# Patient Record
Sex: MALE | Race: WHITE | NOT HISPANIC OR LATINO | Employment: FULL TIME | URBAN - METROPOLITAN AREA
[De-identification: names, ages, dates, MRNs, and addresses within clinical notes are randomized per-mention and may not be internally consistent; named-entity substitution may affect disease eponyms.]

---

## 2021-11-22 ENCOUNTER — TELEPHONE (OUTPATIENT)
Dept: SCHEDULING | Facility: IMAGING CENTER | Age: 31
End: 2021-11-22

## 2021-11-27 NOTE — PROGRESS NOTES
Subjective:     CC:  Diagnoses of Obesity (BMI 30.0-34.9), Chronic GERD, Need for vaccination, Need for hepatitis C screening test, Screening for HIV (human immunodeficiency virus), Jackhammer esophagus, and Chronic pain of left knee were pertinent to this visit.    HISTORY OF THE PRESENT ILLNESS: Patient is a 31 y.o. male. This pleasant patient is here today to establish care and discuss chronic conditions. His prior PCP was provider in [place].    Problem   Obesity (Bmi 30.0-34.9)    Chronic condition. He tries to eat healthy in general. Drinks 1 soda a day. He lifts weight, running, golf regularly.     Chronic Gerd    Chronic condition. He just started taking pantoprazole 20mg daily for 2 weeks with good relief of symptoms. He would like a refill on his medication today. No acute concerns.     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 Has been ongoing for 1.5 yrs. Patient states he still feels discomfort in the back of his throat, associated with shortness of breath with vigorous exercise. He feels the acid reflux part is controlled with Protonix but he has this persistent discomfort in his throat that bother him. He is willing to even go for surgical intervention if necessary. Symptom severity rated as 3/10.     Chronic Pain of Left Knee    Chronic condition. He had previous left knee injury in 2019. He was previously doing PT in [place] and recently has been doing PT here in [place] and [facility]. No acute concerns at this time.         Current Outpatient Medications Ordered in Epic   Medication Sig Dispense Refill   • azelastine (ASTELIN) 137 MCG/SPRAY nasal spray Administer 2 Sprays into affected nostril(S).     • fluticasone (FLONASE) 50 MCG/ACT nasal spray Administer 1-2 Sprays into affected nostril(S).     • pantoprazole (PROTONIX) 20 MG tablet Take 1 Tablet by mouth every morning before breakfast for 30 days. 30 Tablet 3     No current Epic-ordered facility-administered  "medications on file.     SH: works as , moved from [place] to [place] around 07/2021, no regular contact with chemicals, occasional cigar, social EtOH, denies illicit drugs    Health Maintenance: reviewed and discussed with patient  Vaccines: due for flu, Tdap received 02/2021    Patient desires flu vaccine today. Denies recent sickness. Never had allergic reaction to this vaccine in the past.    ROS:   Gen: no fevers/chills, no changes in weight  Eyes: no changes in vision  ENT: no sore throat, no hearing loss, no bloody nose, + throat discomfort  Pulm: + shortness of breath with vigorous exercise, no cough  CV: no chest pain, no palpitations  GI: no nausea/vomiting, no diarrhea  : no dysuria  MSk: no myalgias  Skin: no rash  Neuro: no headaches      Objective:     Exam: /80 (BP Location: Left arm, Patient Position: Sitting, BP Cuff Size: Adult)   Pulse 71   Temp 36.6 °C (97.8 °F) (Temporal)   Resp 16   Ht 1.88 m (6' 2\")   Wt 109 kg (240 lb)   SpO2 95%  Body mass index is 30.81 kg/m².    General: Normal appearing. No distress.  HEENT: Normocephalic. Eyes conjunctiva clear lids without ptosis, ears normal shape and contour, canals are clear bilaterally, tympanic membranes are benign, nasal mucosa benign, oropharynx is without erythema, edema or exudates.   Neck: Supple without JVD or bruit. Thyroid is not enlarged.  Pulmonary: Clear to ausculation.  Normal effort. No rales, ronchi, or wheezing.  Cardiovascular: Regular rate and rhythm without murmur. Carotid and radial pulses are intact and equal bilaterally.  Abdomen: Soft, nontender, nondistended. Normal bowel sounds.  Neurologic: Grossly nonfocal  Skin: Warm and dry.  No obvious lesions.  Musculoskeletal: Normal gait. No extremity cyanosis, clubbing, or edema.  Psych: Normal mood and affect. Alert and oriented x3. Judgment and insight is normal.    Labs: no recent labs available for review    Assessment & Plan:   31 y.o. male " with the following -    Problem List Items Addressed This Visit     Obesity (BMI 30.0-34.9)  Body mass index is 30.81 kg/m².  - f/u CMP, HbA1C, lipid panel, thyroid panel  - discussed lifestyle modifications including weight loss (lose 5-10% of current body weight, no more than 2 pounds per week), healthy diet (eat regular meals with a variety of food, limit daily intake of saturated fat and sodium, limit high-sugar foods, avoid sodas and alcohol, and stay physically active (at least 30 minutes of moderate-intensity physical activity 3-5x/week)   - pt was offered referral to dietician/nutrition specialist as per USPSTF guideline, patient declined today    Relevant Orders    Comp Metabolic Panel    HEMOGLOBIN A1C    Lipid Profile    TSH WITH REFLEX TO FT4    Patient identified as having weight management issue.  Appropriate orders and counseling given.    Chronic GERD  Chronic condition, stable.  - cont pantoprazole 20mg daily, Rx sent    Relevant Medications    pantoprazole (PROTONIX) 20 MG tablet    Other Relevant Orders    Comp Metabolic Panel    Jackhammer esophagus  Chronic condition, uncontrolled. Suspect most of his symptoms are triggered by the Jackhammer esophagus.  - f/u CXR  - advised to trial peppermint oil  - consider CCB if symptoms not improved or worsen  - consider GI referral    Relevant Medications    pantoprazole (PROTONIX) 20 MG tablet    Other Relevant Orders    DX-CHEST-2 VIEWS    Chronic pain of left knee  Chronic condition, stable.  - cont PT      Other Visit Diagnoses     Need for vaccination  Pt desires flu vaccination. Risks and benefits discussed. No contraindications today. Vaccine given. Follow-up precautions discussed.        Relevant Orders    INFLUENZA VACCINE QUAD INJ (PF) (Completed)    Need for hepatitis C screening test        Relevant Orders    HEP C VIRUS ANTIBODY    Screening for HIV (human immunodeficiency virus)        Relevant Orders    HIV AG/AB COMBO ASSAY DIAGNOSTIC         Return in about 3 weeks (around 12/22/2021) for lab results.    Please note that this dictation was created using voice recognition software. I have made every reasonable attempt to correct obvious errors, but I expect that there are errors of grammar and possibly content that I did not discover before finalizing the note.

## 2021-12-01 ENCOUNTER — HOSPITAL ENCOUNTER (OUTPATIENT)
Dept: RADIOLOGY | Facility: MEDICAL CENTER | Age: 31
End: 2021-12-01
Attending: STUDENT IN AN ORGANIZED HEALTH CARE EDUCATION/TRAINING PROGRAM
Payer: COMMERCIAL

## 2021-12-01 ENCOUNTER — OFFICE VISIT (OUTPATIENT)
Dept: MEDICAL GROUP | Facility: MEDICAL CENTER | Age: 31
End: 2021-12-01
Payer: COMMERCIAL

## 2021-12-01 ENCOUNTER — HOSPITAL ENCOUNTER (OUTPATIENT)
Dept: LAB | Facility: MEDICAL CENTER | Age: 31
End: 2021-12-01
Attending: STUDENT IN AN ORGANIZED HEALTH CARE EDUCATION/TRAINING PROGRAM
Payer: COMMERCIAL

## 2021-12-01 VITALS
HEART RATE: 71 BPM | TEMPERATURE: 97.8 F | HEIGHT: 74 IN | OXYGEN SATURATION: 95 % | WEIGHT: 240 LBS | SYSTOLIC BLOOD PRESSURE: 116 MMHG | BODY MASS INDEX: 30.8 KG/M2 | DIASTOLIC BLOOD PRESSURE: 80 MMHG | RESPIRATION RATE: 16 BRPM

## 2021-12-01 DIAGNOSIS — G89.29 CHRONIC PAIN OF LEFT KNEE: ICD-10-CM

## 2021-12-01 DIAGNOSIS — Z23 NEED FOR VACCINATION: ICD-10-CM

## 2021-12-01 DIAGNOSIS — K21.9 CHRONIC GERD: ICD-10-CM

## 2021-12-01 DIAGNOSIS — Z11.4 SCREENING FOR HIV (HUMAN IMMUNODEFICIENCY VIRUS): ICD-10-CM

## 2021-12-01 DIAGNOSIS — Z11.59 NEED FOR HEPATITIS C SCREENING TEST: ICD-10-CM

## 2021-12-01 DIAGNOSIS — M25.562 CHRONIC PAIN OF LEFT KNEE: ICD-10-CM

## 2021-12-01 DIAGNOSIS — K22.4 JACKHAMMER ESOPHAGUS: ICD-10-CM

## 2021-12-01 DIAGNOSIS — E66.9 OBESITY (BMI 30.0-34.9): ICD-10-CM

## 2021-12-01 PROBLEM — E66.811 OBESITY (BMI 30.0-34.9): Status: ACTIVE | Noted: 2021-12-01

## 2021-12-01 LAB
ALBUMIN SERPL BCP-MCNC: 4.7 G/DL (ref 3.2–4.9)
ALBUMIN/GLOB SERPL: 1.8 G/DL
ALP SERPL-CCNC: 82 U/L (ref 30–99)
ALT SERPL-CCNC: 66 U/L (ref 2–50)
ANION GAP SERPL CALC-SCNC: 10 MMOL/L (ref 7–16)
AST SERPL-CCNC: 37 U/L (ref 12–45)
BILIRUB SERPL-MCNC: 0.5 MG/DL (ref 0.1–1.5)
BUN SERPL-MCNC: 17 MG/DL (ref 8–22)
CALCIUM SERPL-MCNC: 9.5 MG/DL (ref 8.4–10.2)
CHLORIDE SERPL-SCNC: 105 MMOL/L (ref 96–112)
CHOLEST SERPL-MCNC: 206 MG/DL (ref 100–199)
CO2 SERPL-SCNC: 26 MMOL/L (ref 20–33)
CREAT SERPL-MCNC: 1.26 MG/DL (ref 0.5–1.4)
EST. AVERAGE GLUCOSE BLD GHB EST-MCNC: 91 MG/DL
FASTING STATUS PATIENT QL REPORTED: NORMAL
GLOBULIN SER CALC-MCNC: 2.6 G/DL (ref 1.9–3.5)
GLUCOSE SERPL-MCNC: 92 MG/DL (ref 65–99)
HBA1C MFR BLD: 4.8 % (ref 4–5.6)
HCV AB SER QL: NORMAL
HDLC SERPL-MCNC: 45 MG/DL
HIV 1+2 AB+HIV1 P24 AG SERPL QL IA: NORMAL
LDLC SERPL CALC-MCNC: 131 MG/DL
POTASSIUM SERPL-SCNC: 4.4 MMOL/L (ref 3.6–5.5)
PROT SERPL-MCNC: 7.3 G/DL (ref 6–8.2)
SODIUM SERPL-SCNC: 141 MMOL/L (ref 135–145)
TRIGL SERPL-MCNC: 152 MG/DL (ref 0–149)
TSH SERPL DL<=0.005 MIU/L-ACNC: 2.26 UIU/ML (ref 0.38–5.33)

## 2021-12-01 PROCEDURE — 36415 COLL VENOUS BLD VENIPUNCTURE: CPT

## 2021-12-01 PROCEDURE — 83036 HEMOGLOBIN GLYCOSYLATED A1C: CPT

## 2021-12-01 PROCEDURE — 90686 IIV4 VACC NO PRSV 0.5 ML IM: CPT | Performed by: STUDENT IN AN ORGANIZED HEALTH CARE EDUCATION/TRAINING PROGRAM

## 2021-12-01 PROCEDURE — 90471 IMMUNIZATION ADMIN: CPT | Performed by: STUDENT IN AN ORGANIZED HEALTH CARE EDUCATION/TRAINING PROGRAM

## 2021-12-01 PROCEDURE — G0475 HIV COMBINATION ASSAY: HCPCS

## 2021-12-01 PROCEDURE — 86803 HEPATITIS C AB TEST: CPT

## 2021-12-01 PROCEDURE — 84443 ASSAY THYROID STIM HORMONE: CPT

## 2021-12-01 PROCEDURE — 71046 X-RAY EXAM CHEST 2 VIEWS: CPT

## 2021-12-01 PROCEDURE — 99204 OFFICE O/P NEW MOD 45 MIN: CPT | Mod: 25 | Performed by: STUDENT IN AN ORGANIZED HEALTH CARE EDUCATION/TRAINING PROGRAM

## 2021-12-01 PROCEDURE — 80061 LIPID PANEL: CPT

## 2021-12-01 PROCEDURE — 80053 COMPREHEN METABOLIC PANEL: CPT

## 2021-12-01 RX ORDER — IBUPROFEN 600 MG/1
600 TABLET ORAL
COMMUNITY
End: 2021-12-01

## 2021-12-01 RX ORDER — AZELASTINE 1 MG/ML
2 SPRAY, METERED NASAL
COMMUNITY
Start: 2021-03-03 | End: 2022-01-06

## 2021-12-01 RX ORDER — PANTOPRAZOLE SODIUM 20 MG/1
1 TABLET, DELAYED RELEASE ORAL
COMMUNITY
Start: 2021-01-25 | End: 2021-12-01 | Stop reason: SDUPTHER

## 2021-12-01 RX ORDER — PANTOPRAZOLE SODIUM 20 MG/1
20 TABLET, DELAYED RELEASE ORAL
Qty: 30 TABLET | Refills: 3 | Status: SHIPPED | OUTPATIENT
Start: 2021-12-01 | End: 2021-12-21

## 2021-12-01 RX ORDER — FLUTICASONE PROPIONATE 50 MCG
1-2 SPRAY, SUSPENSION (ML) NASAL
COMMUNITY
Start: 2021-02-17 | End: 2022-01-06

## 2021-12-01 ASSESSMENT — PATIENT HEALTH QUESTIONNAIRE - PHQ9: CLINICAL INTERPRETATION OF PHQ2 SCORE: 0

## 2021-12-05 ENCOUNTER — HOSPITAL ENCOUNTER (EMERGENCY)
Facility: MEDICAL CENTER | Age: 31
End: 2021-12-05
Attending: EMERGENCY MEDICINE
Payer: COMMERCIAL

## 2021-12-05 VITALS
SYSTOLIC BLOOD PRESSURE: 140 MMHG | DIASTOLIC BLOOD PRESSURE: 95 MMHG | HEIGHT: 74 IN | BODY MASS INDEX: 30.84 KG/M2 | WEIGHT: 240.3 LBS | OXYGEN SATURATION: 95 % | RESPIRATION RATE: 18 BRPM | TEMPERATURE: 97.9 F | HEART RATE: 66 BPM

## 2021-12-05 DIAGNOSIS — S61.210A LACERATION OF RIGHT INDEX FINGER, FOREIGN BODY PRESENCE UNSPECIFIED, NAIL DAMAGE STATUS UNSPECIFIED, INITIAL ENCOUNTER: ICD-10-CM

## 2021-12-05 PROCEDURE — 303747 HCHG EXTRA SUTURE

## 2021-12-05 PROCEDURE — 304999 HCHG REPAIR-SIMPLE/INTERMED LEVEL 1

## 2021-12-05 PROCEDURE — 99282 EMERGENCY DEPT VISIT SF MDM: CPT

## 2021-12-05 PROCEDURE — 304217 HCHG IRRIGATION SYSTEM

## 2021-12-05 PROCEDURE — 700101 HCHG RX REV CODE 250: Performed by: EMERGENCY MEDICINE

## 2021-12-05 RX ORDER — LIDOCAINE HYDROCHLORIDE 20 MG/ML
20 INJECTION, SOLUTION INFILTRATION; PERINEURAL ONCE
Status: COMPLETED | OUTPATIENT
Start: 2021-12-05 | End: 2021-12-05

## 2021-12-05 RX ADMIN — LIDOCAINE HYDROCHLORIDE 20 ML: 20 INJECTION, SOLUTION INFILTRATION; PERINEURAL at 18:30

## 2021-12-05 ASSESSMENT — PAIN DESCRIPTION - DESCRIPTORS: DESCRIPTORS: ACHING;TEARING

## 2021-12-06 NOTE — ED PROVIDER NOTES
ED Provider Note    CHIEF COMPLAINT  Chief Complaint   Patient presents with   • Laceration   • Digit Pain       HPI  Deangelo Tovar is a 31 y.o. male who presents for evaluation of accidental laceration to the distal aspect of the right index finger just distal to the DIP joint on the volar aspect.  The patient was apparently doing some work in the kitchen, picked up a knife and sustained a 1.5 cm laceration transversely across the volar aspect of his digit.  He thinks his tetanus is up-to-date.  Injury occurred within the last hour.  Patient is otherwise healthy.  No other symptoms reported such as numbness weakness or tingling    REVIEW OF SYSTEMS  See HPI for further details.  No numbness weakness or tingling all other systems are negative.     PAST MEDICAL HISTORY  No past medical history on file.  No significant medical history  FAMILY HISTORY  Noncontributory    SOCIAL HISTORY  Social History     Socioeconomic History   • Marital status:      Spouse name: Not on file   • Number of children: Not on file   • Years of education: Not on file   • Highest education level: Not on file   Occupational History   • Not on file   Tobacco Use   • Smoking status: Never Smoker   • Smokeless tobacco: Never Used   Substance and Sexual Activity   • Alcohol use: Yes     Comment: Occasionally   • Drug use: Not Currently   • Sexual activity: Not on file   Other Topics Concern   • Not on file   Social History Narrative   • Not on file     Social Determinants of Health     Financial Resource Strain:    • Difficulty of Paying Living Expenses: Not on file   Food Insecurity:    • Worried About Running Out of Food in the Last Year: Not on file   • Ran Out of Food in the Last Year: Not on file   Transportation Needs:    • Lack of Transportation (Medical): Not on file   • Lack of Transportation (Non-Medical): Not on file   Physical Activity:    • Days of Exercise per Week: Not on file   • Minutes of Exercise per Session: Not on  "file   Stress:    • Feeling of Stress : Not on file   Social Connections:    • Frequency of Communication with Friends and Family: Not on file   • Frequency of Social Gatherings with Friends and Family: Not on file   • Attends Jain Services: Not on file   • Active Member of Clubs or Organizations: Not on file   • Attends Club or Organization Meetings: Not on file   • Marital Status: Not on file   Intimate Partner Violence:    • Fear of Current or Ex-Partner: Not on file   • Emotionally Abused: Not on file   • Physically Abused: Not on file   • Sexually Abused: Not on file   Housing Stability:    • Unable to Pay for Housing in the Last Year: Not on file   • Number of Places Lived in the Last Year: Not on file   • Unstable Housing in the Last Year: Not on file       SURGICAL HISTORY  No past surgical history on file.    CURRENT MEDICATIONS  Home Medications     Reviewed by Joe Michael R.N. (Registered Nurse) on 12/05/21 at 1816  Med List Status: Not Addressed   Medication Last Dose Status   azelastine (ASTELIN) 137 MCG/SPRAY nasal spray  Active   fluticasone (FLONASE) 50 MCG/ACT nasal spray  Active   pantoprazole (PROTONIX) 20 MG tablet  Active                ALLERGIES  No Known Allergies    PHYSICAL EXAM  VITAL SIGNS: /92   Pulse 66   Temp 36.6 °C (97.9 °F) (Temporal)   Resp 18   Ht 1.88 m (6' 2\")   Wt 109 kg (240 lb 4.8 oz)   SpO2 98%   BMI 30.85 kg/m²       Constitutional: Well developed, Well nourished, No acute distress, Non-toxic appearance.   HENT: Normocephalic, Atraumatic, Bilateral external ears normal, Oropharynx moist, No oral exudates, Nose normal.   Eyes: PERRLA, EOMI, Conjunctiva normal, No discharge.   Neck: Normal range of motion, No tenderness, Supple, No stridor.   Cardiovascular: Normal heart rate, Normal rhythm, No murmurs, No rubs, No gallops.   Thorax & Lungs: Normal breath sounds, No respiratory distress, No wheezing, No chest tenderness.   Abdomen: Bowel sounds " normal, Soft, No tenderness, No masses, No pulsatile masses.   Skin: Warm, Dry, No erythema, No rash.   Extremities: Right hand exam is notable for a transverse laceration deep enough to require sutures distal to the DIP joint of the left index finger on the volar aspect.  Flexion and extension is normal two-point discrimination distal to the injury is normal.  No exposed tendon or bone noted on exam  Neurologic: Alert & oriented x 3, Normal motor function, Normal sensory function, No focal deficits noted.   Psychiatric: Affect normal, Judgment normal, Mood normal.         COURSE & MEDICAL DECISION MAKING  Pertinent Labs & Imaging studies reviewed. (See chart for details)  Physician procedure 1.5 cm finger laceration.  The wound was anesthetized with a digital block on the volar aspect using a total of 3 cc of 2% lidocaine without epinephrine.  The wound was copiously irrigated with sterile saline.  Sterile prep and drape.  The wound was gently explored no underlying foreign body or exposed tendon bone or blood vessel.  A total of 5, five-point 0 interrupted nylon sutures were placed.  Good cosmetic result.  No complications.    Patient is apparently up-to-date on his tetanus.  This is a low risk wound for infection therefore I do not feel that antibiotic prophylaxis is indicated.  Suture removal in 7 to 10 days.  I have provided the patient with supplies for antibiotic ointment, dressing changes and Coban    FINAL IMPRESSION  1.  1.5 cm finger laceration, right index finger      Electronically signed by: Corbin Montes M.D., 12/5/2021 6:24 PM

## 2021-12-06 NOTE — ED TRIAGE NOTES
"Presents complaining of an accidental self inflicted right index laceration with a knife, incurred earlier today.  Chief Complaint   Patient presents with   • Laceration   • Digit Pain     /92   Pulse 66   Temp 36.6 °C (97.9 °F) (Temporal)   Resp 18   Ht 1.88 m (6' 2\")   Wt 109 kg (240 lb 4.8 oz)   SpO2 98%   BMI 30.85 kg/m²   Has this patient been vaccinated for COVID YES       "

## 2021-12-17 PROBLEM — E78.2 MIXED HYPERLIPIDEMIA: Status: ACTIVE | Noted: 2021-12-17

## 2021-12-19 NOTE — PROGRESS NOTES
Subjective:     CC: lab results f/u    HPI:   Deangelo presents today for lab results f/u.    Problem   Elevated Alt Measurement    Acute issue. ALT 60s.     Mixed Hyperlipidemia    Acute issue. Most recent lipid panel with  and .     Obesity (Bmi 30.0-34.9)    Chronic condition. He tries to eat healthy in general. Drinks 1 soda a day. He lifts weight, running, golf regularly.     Chronic Gerd    Chronic condition. He has been taking pantoprazole 20mg daily which give him mild relief of his symptoms. He would like to trial a higher dose and see if that would provide more relief.     Jackhammer Esophagus    Chronic condition. Diagnosed 2016 in [place]. Has been ongoing for 1.5 yrs. Patient states he still feels discomfort in the back of his throat, associated with shortness of breath with vigorous exercise. He feels the acid reflux part is relatively controlled with Protonix but he has this persistent discomfort in his throat that bother him. He is willing to even go for surgical intervention if necessary. Symptom severity rated as 3/10.         Current Outpatient Medications Ordered in Epic   Medication Sig Dispense Refill   • pantoprazole (PROTONIX) 40 MG Tablet Delayed Response Take 1 Tablet by mouth every day. 90 Tablet 1   • azelastine (ASTELIN) 137 MCG/SPRAY nasal spray Administer 2 Sprays into affected nostril(S).     • fluticasone (FLONASE) 50 MCG/ACT nasal spray Administer 1-2 Sprays into affected nostril(S).       No current Epic-ordered facility-administered medications on file.     SH: works as , moved from [place] to [place] around 07/2021, no regular contact with chemicals, occasional cigar, social EtOH, denies illicit drugs     Health Maintenance: reviewed and discussed with patient  Vaccines: flu received 12/2021, Tdap received 02/2021, Moderna COVID #2 received 05/2021, plans to get booster shot at local pharmacy    ROS:  Gen: no fevers/chills  ENT: + throat  "discomfort  Pulm: no sob, no cough  CV: no chest pain, no palpitations  GI: no nausea/vomiting, no diarrhea  Skin: no rash  Neuro: no headaches, no numbness/tingling    Objective:     Exam:  /74 (BP Location: Left arm, Patient Position: Sitting, BP Cuff Size: Adult)   Pulse 78   Temp 36.6 °C (97.8 °F) (Temporal)   Resp 18   Ht 1.88 m (6' 2\")   Wt 114 kg (250 lb 7.1 oz)   SpO2 100%   BMI 32.15 kg/m²  Body mass index is 32.15 kg/m².    General: Normal appearing. No distress.  Pulmonary: Clear to ausculation.  Normal effort. No rales, ronchi, or wheezing.  Cardiovascular: Regular rate and rhythm without murmur. Carotid and radial pulses are intact and equal bilaterally.  Neurologic: Grossly nonfocal    Labs:   Lab Results   Component Value Date/Time    SODIUM 141 12/01/2021 09:17 AM    POTASSIUM 4.4 12/01/2021 09:17 AM    CHLORIDE 105 12/01/2021 09:17 AM    CO2 26 12/01/2021 09:17 AM    ANION 10.0 12/01/2021 09:17 AM    GLUCOSE 92 12/01/2021 09:17 AM    BUN 17 12/01/2021 09:17 AM    CREATININE 1.26 12/01/2021 09:17 AM    CALCIUM 9.5 12/01/2021 09:17 AM    ASTSGOT 37 12/01/2021 09:17 AM    ALTSGPT 66 (H) 12/01/2021 09:17 AM    TBILIRUBIN 0.5 12/01/2021 09:17 AM    ALBUMIN 4.7 12/01/2021 09:17 AM    TOTPROTEIN 7.3 12/01/2021 09:17 AM    GLOBULIN 2.6 12/01/2021 09:17 AM    AGRATIO 1.8 12/01/2021 09:17 AM     Lab Results   Component Value Date/Time    HBA1C 4.8 12/01/2021 09:17 AM      Lab Results   Component Value Date/Time    TSHULTRASEN 2.260 12/01/2021 0917     Lab Results   Component Value Date/Time    CHOLSTRLTOT 206 (H) 12/01/2021 0917    TRIGLYCERIDE 152 (H) 12/01/2021 0917    HDL 45 12/01/2021 0917     (H) 12/01/2021 0917       Assessment & Plan:     31 y.o. male with the following -     1. Mixed hyperlipidemia  New condition.  - advised to maintain a healthy weight, regular aerobic exercise, and eating diets lower in saturated fats  - Lipid Profile; Future    2. Chronic GERD  - increase " pantoprazole 20 to 40mg daily  - pantoprazole (PROTONIX) 40 MG Tablet Delayed Response; Take 1 Tablet by mouth every day.  Dispense: 90 Tablet; Refill: 1    3. Obesity (BMI 30.0-34.9)  Body mass index is 32.15 kg/m².  - f/u CMP  - discussed lifestyle modifications including weight loss (lose 5-10% of current body weight, no more than 2 pounds per week), healthy diet (eat regular meals with a variety of food, limit daily intake of saturated fat and sodium, limit high-sugar foods, avoid sodas and alcohol, and stay physically active (at least 30 minutes of moderate-intensity physical activity 3-5x/week)   - Comp Metabolic Panel; Future    4. Jackhammer esophagus  - increase pantoprazole 20 to 40mg daily  - pantoprazole (PROTONIX) 40 MG Tablet Delayed Response; Take 1 Tablet by mouth every day.  Dispense: 90 Tablet; Refill: 1    5. Elevated ALT measurement  - US-RUQ; Future  - Comp Metabolic Panel; Future      Return in about 1 year (around 12/21/2022) for annual physical.    Please note that this dictation was created using voice recognition software. I have made every reasonable attempt to correct obvious errors, but I expect that there are errors of grammar and possibly content that I did not discover before finalizing the note.

## 2021-12-21 ENCOUNTER — OFFICE VISIT (OUTPATIENT)
Dept: MEDICAL GROUP | Facility: MEDICAL CENTER | Age: 31
End: 2021-12-21
Payer: COMMERCIAL

## 2021-12-21 VITALS
DIASTOLIC BLOOD PRESSURE: 74 MMHG | HEART RATE: 78 BPM | HEIGHT: 74 IN | TEMPERATURE: 97.8 F | OXYGEN SATURATION: 100 % | WEIGHT: 250.44 LBS | BODY MASS INDEX: 32.14 KG/M2 | SYSTOLIC BLOOD PRESSURE: 114 MMHG | RESPIRATION RATE: 18 BRPM

## 2021-12-21 DIAGNOSIS — K21.9 CHRONIC GERD: ICD-10-CM

## 2021-12-21 DIAGNOSIS — E66.9 OBESITY (BMI 30.0-34.9): ICD-10-CM

## 2021-12-21 DIAGNOSIS — K22.4 JACKHAMMER ESOPHAGUS: ICD-10-CM

## 2021-12-21 DIAGNOSIS — R74.01 ELEVATED ALT MEASUREMENT: ICD-10-CM

## 2021-12-21 DIAGNOSIS — E78.2 MIXED HYPERLIPIDEMIA: ICD-10-CM

## 2021-12-21 PROCEDURE — 99214 OFFICE O/P EST MOD 30 MIN: CPT | Performed by: STUDENT IN AN ORGANIZED HEALTH CARE EDUCATION/TRAINING PROGRAM

## 2021-12-21 RX ORDER — PANTOPRAZOLE SODIUM 40 MG/1
40 TABLET, DELAYED RELEASE ORAL DAILY
Qty: 90 TABLET | Refills: 1 | Status: SHIPPED | OUTPATIENT
Start: 2021-12-21 | End: 2022-01-06 | Stop reason: SDUPTHER

## 2022-01-04 ENCOUNTER — HOSPITAL ENCOUNTER (OUTPATIENT)
Dept: RADIOLOGY | Facility: MEDICAL CENTER | Age: 32
End: 2022-01-04
Attending: STUDENT IN AN ORGANIZED HEALTH CARE EDUCATION/TRAINING PROGRAM
Payer: COMMERCIAL

## 2022-01-04 DIAGNOSIS — R74.01 ELEVATED ALT MEASUREMENT: ICD-10-CM

## 2022-01-04 PROBLEM — K76.0 FATTY LIVER: Status: ACTIVE | Noted: 2022-01-04

## 2022-01-04 PROCEDURE — 76705 ECHO EXAM OF ABDOMEN: CPT

## 2022-01-06 ENCOUNTER — OFFICE VISIT (OUTPATIENT)
Dept: MEDICAL GROUP | Facility: MEDICAL CENTER | Age: 32
End: 2022-01-06
Payer: COMMERCIAL

## 2022-01-06 VITALS
DIASTOLIC BLOOD PRESSURE: 88 MMHG | RESPIRATION RATE: 14 BRPM | SYSTOLIC BLOOD PRESSURE: 122 MMHG | WEIGHT: 250.22 LBS | HEART RATE: 105 BPM | TEMPERATURE: 99.2 F | HEIGHT: 74 IN | BODY MASS INDEX: 32.11 KG/M2 | OXYGEN SATURATION: 93 %

## 2022-01-06 DIAGNOSIS — K22.4 JACKHAMMER ESOPHAGUS: ICD-10-CM

## 2022-01-06 DIAGNOSIS — U07.1 COVID-19: ICD-10-CM

## 2022-01-06 DIAGNOSIS — K21.9 CHRONIC GERD: ICD-10-CM

## 2022-01-06 DIAGNOSIS — J02.9 SORE THROAT: ICD-10-CM

## 2022-01-06 DIAGNOSIS — Z20.822 SUSPECTED COVID-19 VIRUS INFECTION: ICD-10-CM

## 2022-01-06 LAB
EXTERNAL QUALITY CONTROL: ABNORMAL
FLUAV+FLUBV AG SPEC QL IA: NORMAL
INT CON NEG: NEGATIVE
INT CON NEG: NORMAL
INT CON POS: NORMAL
INT CON POS: POSITIVE
S PYO AG THROAT QL: NEGATIVE
SARS-COV+SARS-COV-2 AG RESP QL IA.RAPID: POSITIVE

## 2022-01-06 PROCEDURE — 87426 SARSCOV CORONAVIRUS AG IA: CPT | Performed by: STUDENT IN AN ORGANIZED HEALTH CARE EDUCATION/TRAINING PROGRAM

## 2022-01-06 PROCEDURE — 87880 STREP A ASSAY W/OPTIC: CPT | Performed by: STUDENT IN AN ORGANIZED HEALTH CARE EDUCATION/TRAINING PROGRAM

## 2022-01-06 PROCEDURE — 87804 INFLUENZA ASSAY W/OPTIC: CPT | Performed by: STUDENT IN AN ORGANIZED HEALTH CARE EDUCATION/TRAINING PROGRAM

## 2022-01-06 PROCEDURE — 99214 OFFICE O/P EST MOD 30 MIN: CPT | Mod: CS | Performed by: STUDENT IN AN ORGANIZED HEALTH CARE EDUCATION/TRAINING PROGRAM

## 2022-01-06 RX ORDER — PANTOPRAZOLE SODIUM 40 MG/1
40 TABLET, DELAYED RELEASE ORAL DAILY
Qty: 90 TABLET | Refills: 3 | Status: SHIPPED | OUTPATIENT
Start: 2022-01-06 | End: 2022-01-07 | Stop reason: SDUPTHER

## 2022-01-06 RX ORDER — ACETAMINOPHEN 120 MG/1
120 SUPPOSITORY RECTAL EVERY 4 HOURS PRN
COMMUNITY
End: 2022-09-03

## 2022-01-06 RX ORDER — LIDOCAINE HYDROCHLORIDE 20 MG/ML
15 SOLUTION OROPHARYNGEAL PRN
Qty: 1200 ML | Refills: 1 | Status: SHIPPED | OUTPATIENT
Start: 2022-01-06 | End: 2022-01-07 | Stop reason: SDUPTHER

## 2022-01-06 NOTE — PROGRESS NOTES
"susSubjective:     CC: COVID testing    HPI:   Deangelo presents today for COVID testing    Problem   Covid-19    Acute issue. He traveled to [place] recently and returned 1/3/2022.    Date of symptoms onset: 1/4/2022  Symptoms: fever 100.6F, sore throat, body aches, fatigue, dry cough throughout the day  Date of COVID positive test: has not been tested  Medications tried: OTC Tylenol with mild relief, Zycam, Advil cold and sinus  Home max temperature: 100.6F  Home O2 saturation: did not check  He has been able to tolerate PO but with reduced appetite. Normal urination.    COVID vaccination status: Moderna #2 received 05/2021  Potential exposure: trip to [place]       Sore Throat    Acute issue.     Fatty Liver    US 01/2022 showed fatty liver.     Chronic Gerd    Chronic condition. He has been taking pantoprazole 20mg daily which give him mild relief of his symptoms. He would like to trial a higher dose and see if that would provide more relief.         Current Outpatient Medications Ordered in Epic   Medication Sig Dispense Refill   • acetaminophen (TYLENOL) 120 MG Suppos Insert 120 mg into the rectum every four hours as needed.     • pantoprazole (PROTONIX) 40 MG Tablet Delayed Response Take 1 Tablet by mouth every day. 90 Tablet 3   • lidocaine (XYLOCAINE) 2 % Solution Take 15 mL by mouth as needed for Throat/Mouth Pain (may be applied directly to surface of ulcers or used as a swish and spit). 1200 mL 1     No current [facility]-ordered facility-administered medications on file.       ROS:  Gen: + fevers/chills  ENT: + sore throat  Pulm: no sob, + dry cough  CV: no chest pain, no palpitations  GI: no nausea/vomiting, no diarrhea  : no dysuria  MSk: + myalgias  Skin: no rash  Neuro: + headaches    Objective:     Exam:  /88 (BP Location: Left arm, Patient Position: Sitting, BP Cuff Size: Large adult)   Pulse (!) 105   Temp 37.3 °C (99.2 °F) (Temporal)   Resp 14   Ht 1.88 m (6' 2\")   Wt 113 kg (250 lb 3.6 oz)  "  SpO2 93%   BMI 32.13 kg/m²  Body mass index is 32.13 kg/m².    Gen: Alert and oriented, No apparent distress.  Neck: Neck is supple without lymphadenopathy.  Lungs: Normal effort, CTA bilaterally, no wheezes, rhonchi, or rales  CV: Regular rate and rhythm. No murmurs, rubs, or gallops.  Ext: No clubbing, cyanosis, edema.    Labs:   POC flu negative  POC strep negative  POC COVID positive    Assessment & Plan:     31 y.o. male with the following -     1. COVID-19  Acute issue, mild at this time.  - cont symptomatic therapy as needed: OTC Tylenol as needed for pain/headache/fever, antihistamine/decongestant as needed for runny nose/congestion. Call or return to clinic prn if these symptoms worsen or fail to improve as anticipated.  - advised to maintain adequate hydration, regular handwashing, facemask, social distancing  - advised to monitor pulse oximetry at home and to maintain O2 sat 90% or above  - may consider trying over the counter supplements to help with your symptoms: vitamin C 500mg two times daily, vitamin D3 2000 international units daily, zinc 75-100mg daily, melatonin 5-10mg before bedtime as needed  - encouraged patient to get COVID-19 vaccine once she recovers from acute infection  - strict ED precautions discussed if worsening mental status, chest pain, shortness of breath, lack of urination    2. Suspected COVID-19 virus infection  - POCT SARS-COV Antigen BECKA (Symptomatic Only)  - POCT Influenza A/B    3. Sore throat  - POCT Rapid Strep A  - lidocaine (XYLOCAINE) 2 % Solution; Take 15 mL by mouth as needed for Throat/Mouth Pain (may be applied directly to surface of ulcers or used as a swish and spit).  Dispense: 1200 mL; Refill: 1  - POCT Influenza A/B    4. Chronic GERD  Chronic condition, stable.  - cont Protonix 40mg daily, refilled today  - pantoprazole (PROTONIX) 40 MG Tablet Delayed Response; Take 1 Tablet by mouth every day.  Dispense: 90 Tablet; Refill: 3    5. Jackhammer  esophagus  Chronic condition, stable.  - cont Protonix 40mg daily, refilled today  - pantoprazole (PROTONIX) 40 MG Tablet Delayed Response; Take 1 Tablet by mouth every day.  Dispense: 90 Tablet; Refill: 3    Other orders  - acetaminophen (TYLENOL) 120 MG Suppos; Insert 120 mg into the rectum every four hours as needed.      Return if symptoms worsen or fail to improve.    Please note that this dictation was created using voice recognition software. I have made every reasonable attempt to correct obvious errors, but I expect that there are errors of grammar and possibly content that I did not discover before finalizing the note.

## 2022-01-07 DIAGNOSIS — K21.9 CHRONIC GERD: ICD-10-CM

## 2022-01-07 DIAGNOSIS — J02.9 SORE THROAT: ICD-10-CM

## 2022-01-07 DIAGNOSIS — K22.4 JACKHAMMER ESOPHAGUS: ICD-10-CM

## 2022-01-07 RX ORDER — PANTOPRAZOLE SODIUM 40 MG/1
40 TABLET, DELAYED RELEASE ORAL DAILY
Qty: 90 TABLET | Refills: 3 | Status: SHIPPED | OUTPATIENT
Start: 2022-01-07 | End: 2022-09-23

## 2022-01-07 RX ORDER — LIDOCAINE HYDROCHLORIDE 20 MG/ML
15 SOLUTION OROPHARYNGEAL PRN
Qty: 1200 ML | Refills: 1 | Status: SHIPPED | OUTPATIENT
Start: 2022-01-07 | End: 2022-06-08

## 2022-01-07 NOTE — PATIENT INSTRUCTIONS
vitamin C 500mg two times daily, vitamin D3 2000 international units daily, zinc 75-100mg daily, melatonin 5-10mg before bedtime as needed

## 2022-06-07 PROBLEM — M51.26 LUMBAR HERNIATED DISC: Status: ACTIVE | Noted: 2022-06-07

## 2022-06-08 ENCOUNTER — HOSPITAL ENCOUNTER (OUTPATIENT)
Dept: RADIOLOGY | Facility: MEDICAL CENTER | Age: 32
End: 2022-06-08
Attending: STUDENT IN AN ORGANIZED HEALTH CARE EDUCATION/TRAINING PROGRAM
Payer: COMMERCIAL

## 2022-06-08 ENCOUNTER — OFFICE VISIT (OUTPATIENT)
Dept: MEDICAL GROUP | Facility: MEDICAL CENTER | Age: 32
End: 2022-06-08
Payer: COMMERCIAL

## 2022-06-08 VITALS
DIASTOLIC BLOOD PRESSURE: 82 MMHG | TEMPERATURE: 97.9 F | OXYGEN SATURATION: 95 % | BODY MASS INDEX: 31.93 KG/M2 | SYSTOLIC BLOOD PRESSURE: 128 MMHG | HEART RATE: 72 BPM | WEIGHT: 248.79 LBS | HEIGHT: 74 IN | RESPIRATION RATE: 16 BRPM

## 2022-06-08 DIAGNOSIS — M51.26 LUMBAR HERNIATED DISC: ICD-10-CM

## 2022-06-08 DIAGNOSIS — M51.36 ANNULAR TEAR OF LUMBAR DISC: ICD-10-CM

## 2022-06-08 PROCEDURE — 72148 MRI LUMBAR SPINE W/O DYE: CPT

## 2022-06-08 PROCEDURE — 99214 OFFICE O/P EST MOD 30 MIN: CPT | Performed by: STUDENT IN AN ORGANIZED HEALTH CARE EDUCATION/TRAINING PROGRAM

## 2022-06-08 RX ORDER — CYCLOBENZAPRINE HCL 10 MG
10 TABLET ORAL 3 TIMES DAILY PRN
Qty: 30 TABLET | Refills: 1 | Status: SHIPPED | OUTPATIENT
Start: 2022-06-08 | End: 2022-09-23

## 2022-06-08 RX ORDER — METHYLPREDNISOLONE 4 MG/1
TABLET ORAL
Qty: 21 TABLET | Refills: 0 | Status: SHIPPED | OUTPATIENT
Start: 2022-06-08 | End: 2022-09-23

## 2022-06-08 RX ORDER — HYDROCODONE BITARTRATE AND ACETAMINOPHEN 5; 325 MG/1; MG/1
1 TABLET ORAL EVERY 6 HOURS PRN
Qty: 20 TABLET | Refills: 0 | Status: SHIPPED | OUTPATIENT
Start: 2022-06-08 | End: 2022-06-13

## 2022-06-08 NOTE — PROGRESS NOTES
Subjective:     CC: back pain    HPI:   Deangelo presents today for back pain    Problem   Lumbar Herniated Disc    Acute on chronic condition. Patient reports he was water skiing this Monday and felt a pull in his back and couldn't walk afterwards for a day. He felt like his right leg was giving out from the pain. He took medications (Flexeril, Norco) he got from his mother and that helped with pain relief. He is feeling a little better today but still having pain. The fact that it affected his ability to walk this time scared him and he would like to get his back checked out given his prior herniated disc.    He has known herniated disc L5-S1 since age 17 and has had flare up episodes intermittently since then.    Character: sharp low back pain  Onset: Monday 6/6/2022  Location: right lower back  Duration: constant  Exacerbating factors: weight on right leg  Relieving factors: icing, Advil, Flexeril, hydrocodone  Associated symptoms: travels to right thigh at times, no changes in urination or bowel movement  Severity: 3-4/10 sitting, walking 5-6/10, 8-9/10 initially         Current Outpatient Medications Ordered in Epic   Medication Sig Dispense Refill   • cyclobenzaprine (FLEXERIL) 10 mg Tab Take 1 Tablet by mouth 3 times a day as needed for Moderate Pain or Muscle Spasms. 30 Tablet 1   • HYDROcodone-acetaminophen (NORCO) 5-325 MG Tab per tablet Take 1 Tablet by mouth every 6 hours as needed (severe pain) for up to 5 days. 20 Tablet 0   • methylPREDNISolone (MEDROL DOSEPAK) 4 MG Tablet Therapy Pack As directed on the packaging label. 21 Tablet 0   • pantoprazole (PROTONIX) 40 MG Tablet Delayed Response Take 1 Tablet by mouth every day. (Patient not taking: No sig reported) 90 Tablet 3   • lidocaine (XYLOCAINE) 2 % Solution Take 15 mL by mouth as needed for Throat/Mouth Pain (may be applied directly to surface of ulcers or used as a swish and spit). (Patient not taking: No sig reported) 1200 mL 1   • acetaminophen  "(TYLENOL) 120 MG Suppos Insert 120 mg into the rectum every four hours as needed. (Patient not taking: No sig reported)       No current [facility]-ordered facility-administered medications on file.     SH: works as , moved from [place] to [place] around 07/2021, no regular contact with chemicals, occasional cigar, social EtOH, denies illicit drugs     Health Maintenance: reviewed and discussed with patient  Vaccines: flu received 12/2021, Tdap received 02/2021, Moderna COVID #2 received 05/2021, plans to get booster shot at local pharmacy    ROS:  See HPI    Objective:     Exam:  /82 (BP Location: Left arm, Patient Position: Sitting, BP Cuff Size: Adult)   Pulse 72   Temp 36.6 °C (97.9 °F) (Temporal)   Resp 16   Ht 1.88 m (6' 2\")   Wt 113 kg (248 lb 12.6 oz)   SpO2 95%   BMI 31.94 kg/m²  Body mass index is 31.94 kg/m².    Gen: Alert and oriented, No apparent distress.  Lungs: Normal effort, CTA bilaterally, no wheezes, rhonchi, or rales  CV: Regular rate and rhythm. No murmurs, rubs, or gallops.  Ext: No clubbing, cyanosis, edema.  Msk:     + TTP to right lumbar paraspinal muscles with hypertonicity. 5/5 strength and sensation to light touch intact to bilateral lower extremities.    Assessment & Plan:     32 y.o. male with the following -     1. Lumbar herniated disc  Acute on chronic condition, improved but persistent. Uncertain prognosis at this time. Possible acute lumbar sprain vs worsening of previous herniated disc. May need to be evaluated further by orthopedics. Plan to trial course of Medrol dosepak and pain control with Flexeril or Norco as needed per orders. Plan to evaluate with imaging tests below. Will call patient with results and discuss next step based on results.  - advised warm compress, Salonpas patch, avoid exacerbating activities  - MR-LUMBAR SPINE-W/O; Future  - DX-LUMBAR SPINE-4+ VIEWS; Future  - cyclobenzaprine (FLEXERIL) 10 mg Tab; Take 1 Tablet by mouth 3 " times a day as needed for Moderate Pain or Muscle Spasms.  Dispense: 30 Tablet; Refill: 1  - HYDROcodone-acetaminophen (NORCO) 5-325 MG Tab per tablet; Take 1 Tablet by mouth every 6 hours as needed (severe pain) for up to 5 days.  Dispense: 20 Tablet; Refill: 0  - methylPREDNISolone (MEDROL DOSEPAK) 4 MG Tablet Therapy Pack; As directed on the packaging label.  Dispense: 21 Tablet; Refill: 0      Return if symptoms worsen or fail to improve.    Please note that this dictation was created using voice recognition software. I have made every reasonable attempt to correct obvious errors, but I expect that there are errors of grammar and possibly content that I did not discover before finalizing the note.

## 2022-09-03 ENCOUNTER — OFFICE VISIT (OUTPATIENT)
Dept: URGENT CARE | Facility: CLINIC | Age: 32
End: 2022-09-03
Payer: COMMERCIAL

## 2022-09-03 VITALS
RESPIRATION RATE: 16 BRPM | TEMPERATURE: 98.5 F | DIASTOLIC BLOOD PRESSURE: 78 MMHG | OXYGEN SATURATION: 97 % | SYSTOLIC BLOOD PRESSURE: 118 MMHG | HEIGHT: 74 IN | BODY MASS INDEX: 30.34 KG/M2 | WEIGHT: 236.4 LBS | HEART RATE: 78 BPM

## 2022-09-03 DIAGNOSIS — J02.0 STREP PHARYNGITIS: ICD-10-CM

## 2022-09-03 DIAGNOSIS — J02.9 SORE THROAT: ICD-10-CM

## 2022-09-03 LAB
INT CON NEG: NORMAL
INT CON POS: NORMAL
S PYO AG THROAT QL: POSITIVE

## 2022-09-03 PROCEDURE — 99203 OFFICE O/P NEW LOW 30 MIN: CPT | Performed by: NURSE PRACTITIONER

## 2022-09-03 PROCEDURE — 87880 STREP A ASSAY W/OPTIC: CPT | Performed by: NURSE PRACTITIONER

## 2022-09-03 RX ORDER — AMOXICILLIN 875 MG/1
875 TABLET, COATED ORAL 2 TIMES DAILY
Qty: 14 TABLET | Refills: 0 | Status: SHIPPED | OUTPATIENT
Start: 2022-09-03 | End: 2022-09-10

## 2022-09-03 NOTE — PROGRESS NOTES
"Subjective:   Deangelo Tovar is a 32 y.o. male who presents for Pharyngitis (Started Thursday night,)       HPI  Patient presents for evaluation of 2 to 3-day history of sore throat and fatigue.  Denies any known ill contacts or exposures.  Had COVID within the past 3 months.    ROS  All other systems are negative except as documented above within HPI.    MEDS:   Current Outpatient Medications:     cyclobenzaprine (FLEXERIL) 10 mg Tab, Take 1 Tablet by mouth 3 times a day as needed for Moderate Pain or Muscle Spasms., Disp: 30 Tablet, Rfl: 1    methylPREDNISolone (MEDROL DOSEPAK) 4 MG Tablet Therapy Pack, As directed on the packaging label., Disp: 21 Tablet, Rfl: 0    pantoprazole (PROTONIX) 40 MG Tablet Delayed Response, Take 1 Tablet by mouth every day., Disp: 90 Tablet, Rfl: 3  ALLERGIES: No Known Allergies    Patient's PMH, SocHx, SurgHx, FamHx, Drug allergies and medications were reviewed.     Objective:   /78 (BP Location: Left arm, Patient Position: Sitting, BP Cuff Size: Adult)   Pulse 78   Temp 36.9 °C (98.5 °F) (Temporal)   Resp 16   Ht 1.88 m (6' 2\")   Wt 107 kg (236 lb 6.4 oz)   SpO2 97%   BMI 30.35 kg/m²     Physical Exam  Vitals and nursing note reviewed.   Constitutional:       General: He is awake.      Appearance: Normal appearance. He is well-developed and normal weight.   HENT:      Head: Normocephalic and atraumatic.      Right Ear: Tympanic membrane, ear canal and external ear normal.      Left Ear: Tympanic membrane, ear canal and external ear normal.      Nose: Nose normal.      Mouth/Throat:      Lips: Pink.      Mouth: Mucous membranes are moist.      Pharynx: Oropharynx is clear. Uvula midline. Posterior oropharyngeal erythema present.   Eyes:      Extraocular Movements: Extraocular movements intact.      Conjunctiva/sclera: Conjunctivae normal.      Pupils: Pupils are equal, round, and reactive to light.   Neck:      Thyroid: No thyromegaly.      Trachea: Trachea normal. "   Cardiovascular:      Rate and Rhythm: Normal rate and regular rhythm.      Pulses: Normal pulses.      Heart sounds: Normal heart sounds, S1 normal and S2 normal.   Pulmonary:      Effort: Pulmonary effort is normal. No respiratory distress.      Breath sounds: Normal breath sounds. No wheezing, rhonchi or rales.   Abdominal:      General: Bowel sounds are normal.      Palpations: Abdomen is soft.   Musculoskeletal:         General: Normal range of motion.      Cervical back: Full passive range of motion without pain, normal range of motion and neck supple.   Lymphadenopathy:      Cervical: No cervical adenopathy.   Skin:     General: Skin is warm and dry.      Capillary Refill: Capillary refill takes less than 2 seconds.   Neurological:      General: No focal deficit present.      Mental Status: He is alert and oriented to person, place, and time.      Gait: Gait is intact.   Psychiatric:         Attention and Perception: Attention and perception normal.         Mood and Affect: Mood normal.         Speech: Speech normal.         Behavior: Behavior normal. Behavior is cooperative.         Thought Content: Thought content normal.         Judgment: Judgment normal.       Assessment/Plan:   Assessment    1. Strep pharyngitis  - amoxicillin (AMOXIL) 875 MG tablet; Take 1 Tablet by mouth 2 times a day for 7 days.  Dispense: 14 Tablet; Refill: 0    2. Sore throat  - POCT Rapid Strep A      Vital signs stable at today's acute urgent care visit.  Review of any test results completed in clinic.  Begin medications as listed.    Advised the patient to follow-up with the primary care provider/urgent care if symptoms persist.  Red flags discussed and indications to immediately call 911 or present to the ED. All questions were encouraged and answered to the patient's satisfaction and understanding, and they agree to the plan of care.     This is an acute problem with uncertain prognosis, medication management and instructions  as well as management options were provided.  I personally reviewed prior external notes and test results pertinent to today and independently reviewed and interpreted all diagnostics. Time spent evaluating this patient includes preparing for visit, counseling/education, exam, evaluation, obtaining history, and ordering lab/test/procedures.      Please note that this dictation was created using voice recognition software. I have made a reasonable attempt to correct obvious errors, but I expect that there are errors of grammar and possibly content that I did not discover before finalizing the note.

## 2022-09-23 ENCOUNTER — OFFICE VISIT (OUTPATIENT)
Dept: URGENT CARE | Facility: CLINIC | Age: 32
End: 2022-09-23
Payer: COMMERCIAL

## 2022-09-23 ENCOUNTER — HOSPITAL ENCOUNTER (OUTPATIENT)
Facility: MEDICAL CENTER | Age: 32
End: 2022-09-23
Attending: PHYSICIAN ASSISTANT
Payer: COMMERCIAL

## 2022-09-23 VITALS
BODY MASS INDEX: 29.52 KG/M2 | HEART RATE: 72 BPM | OXYGEN SATURATION: 94 % | RESPIRATION RATE: 16 BRPM | WEIGHT: 230 LBS | HEIGHT: 74 IN | DIASTOLIC BLOOD PRESSURE: 80 MMHG | SYSTOLIC BLOOD PRESSURE: 124 MMHG | TEMPERATURE: 98.5 F

## 2022-09-23 DIAGNOSIS — J01.40 ACUTE NON-RECURRENT PANSINUSITIS: ICD-10-CM

## 2022-09-23 LAB — COVID ORDER STATUS COVID19: NORMAL

## 2022-09-23 PROCEDURE — U0003 INFECTIOUS AGENT DETECTION BY NUCLEIC ACID (DNA OR RNA); SEVERE ACUTE RESPIRATORY SYNDROME CORONAVIRUS 2 (SARS-COV-2) (CORONAVIRUS DISEASE [COVID-19]), AMPLIFIED PROBE TECHNIQUE, MAKING USE OF HIGH THROUGHPUT TECHNOLOGIES AS DESCRIBED BY CMS-2020-01-R: HCPCS

## 2022-09-23 PROCEDURE — 99000 SPECIMEN HANDLING OFFICE-LAB: CPT | Performed by: PHYSICIAN ASSISTANT

## 2022-09-23 PROCEDURE — 99213 OFFICE O/P EST LOW 20 MIN: CPT | Performed by: PHYSICIAN ASSISTANT

## 2022-09-23 PROCEDURE — U0005 INFEC AGEN DETEC AMPLI PROBE: HCPCS

## 2022-09-23 RX ORDER — AMOXICILLIN AND CLAVULANATE POTASSIUM 875; 125 MG/1; MG/1
1 TABLET, FILM COATED ORAL 2 TIMES DAILY
Qty: 14 TABLET | Refills: 0 | Status: SHIPPED | OUTPATIENT
Start: 2022-09-28 | End: 2022-10-05

## 2022-09-23 RX ORDER — COVID-19 ANTIGEN TEST
KIT MISCELLANEOUS
COMMUNITY
Start: 2022-09-12 | End: 2022-09-23

## 2022-09-23 NOTE — PROGRESS NOTES
"Subjective:   Deangelo Tovar is a 32 y.o. male who presents for Sore Throat (Sinus pain/pressure, headache, dental pain, x4 days, cough. )      HPI  The patient presents to the Urgent Care with complaints of possible sinus infection onset 4 days ago.  He reports that nasal congestion, sinus discomfort, frontal headaches with radiation pain to his teeth worse with leaning forward.  It is not worse on one side.  He has associated postnasal drip cough.  He has tried Mucinex without much relief. Denies any fever, chills, chest pain, SOB, vomiting, diarrhea. Received COVID vaccine. History of COVID in January without complications.           Medications:    cyclobenzaprine Tabs  methylPREDNISolone Tbpk  pantoprazole Tbec    Allergies: Patient has no known allergies.    Problem List: Deangelo Tovar does not have any pertinent problems on file.    Surgical History:  Past Surgical History:   Procedure Laterality Date    REPAIR, KNEE, ACL Left 2019       Past Social Hx: Deangelo Tovar  reports that he has never smoked. He has never used smokeless tobacco. He reports current alcohol use. He reports that he does not currently use drugs.     Past Family Hx:  Deangelo Tovar family history includes Diabetes in his father; Hypertension in his father.     Problem list, medications, and allergies reviewed by myself today in Epic.     Objective:     /80   Pulse 72   Temp 36.9 °C (98.5 °F) (Temporal)   Resp 16   Ht 1.88 m (6' 2\")   Wt 104 kg (230 lb)   SpO2 94%   BMI 29.53 kg/m²     Physical Exam  Vitals reviewed.   Constitutional:       General: He is not in acute distress.     Appearance: Normal appearance. He is not ill-appearing or toxic-appearing.   HENT:      Head: No right periorbital erythema or left periorbital erythema.      Right Ear: Tympanic membrane and ear canal normal.      Left Ear: Tympanic membrane and ear canal normal.      Nose: Mucosal edema and rhinorrhea present. Rhinorrhea is clear.    "   Right Sinus: No maxillary sinus tenderness or frontal sinus tenderness.      Left Sinus: No maxillary sinus tenderness or frontal sinus tenderness.   Eyes:      Conjunctiva/sclera: Conjunctivae normal.      Pupils: Pupils are equal, round, and reactive to light.   Cardiovascular:      Rate and Rhythm: Normal rate and regular rhythm.      Heart sounds: Normal heart sounds.   Pulmonary:      Effort: Pulmonary effort is normal. No respiratory distress.      Breath sounds: Normal breath sounds. No wheezing, rhonchi or rales.   Musculoskeletal:      Cervical back: Neck supple. No rigidity.   Lymphadenopathy:      Cervical: No cervical adenopathy.   Skin:     General: Skin is warm and dry.   Neurological:      General: No focal deficit present.      Mental Status: He is alert and oriented to person, place, and time.   Psychiatric:         Mood and Affect: Mood normal.         Behavior: Behavior normal.       Diagnosis and associated orders:     1. Acute non-recurrent pansinusitis  - amoxicillin-clavulanate (AUGMENTIN) 875-125 MG Tab; Take 1 Tablet by mouth 2 times a day for 7 days.  Dispense: 14 Tablet; Refill: 0  - SARS-CoV-2 PCR (24 hour In-House): Collect NP swab in [place]; Future     Comments/MDM:     Patient's presenting symptoms and exam findings are consistent with sinusitis most likely viral etiology. Therefore, antibiotics are not indicated at this time. Contingent antibiotic prescription given to patient to fill upon meeting criteria of strict guidelines discussed in length. Patient will not be able to take this until 9/28 if needed.   COVID PCR pending.   I recommend plenty of fluids, Flonase nasal spray, nasal saline washes or judy pot, Mucinex decongestant, Ibuprofen or Tylenol for pain, non-drowsy antihistamine such as Zyrtec or Claritin.      I personally reviewed prior external notes and test results pertinent to today's visit. Pathogenesis of diagnosis discussed including typical length and natural  progression. Supportive care, natural history, differential diagnoses, and indications for immediate follow-up discussed. Patient expresses understanding and agrees to plan. Patient denies any other questions or concerns.     Follow-up with the primary care physician for recheck, reevaluation, and consideration of further management.    Please note that this dictation was created using voice recognition software. I have made a reasonable attempt to correct obvious errors, but I expect that there are errors of grammar and possibly content that I did not discover before finalizing the note.    This note was electronically signed by Kevin Koenig PA-C

## 2022-09-24 LAB
SARS-COV-2 RNA RESP QL NAA+PROBE: NOTDETECTED
SPECIMEN SOURCE: NORMAL

## 2022-12-21 ENCOUNTER — APPOINTMENT (OUTPATIENT)
Dept: MEDICAL GROUP | Facility: MEDICAL CENTER | Age: 32
End: 2022-12-21
Payer: COMMERCIAL

## 2022-12-29 ENCOUNTER — HOSPITAL ENCOUNTER (OUTPATIENT)
Facility: MEDICAL CENTER | Age: 32
End: 2022-12-29
Attending: NURSE PRACTITIONER
Payer: COMMERCIAL

## 2022-12-29 ENCOUNTER — OFFICE VISIT (OUTPATIENT)
Dept: URGENT CARE | Facility: CLINIC | Age: 32
End: 2022-12-29
Payer: COMMERCIAL

## 2022-12-29 VITALS
SYSTOLIC BLOOD PRESSURE: 122 MMHG | WEIGHT: 240 LBS | BODY MASS INDEX: 30.8 KG/M2 | HEART RATE: 94 BPM | RESPIRATION RATE: 16 BRPM | OXYGEN SATURATION: 95 % | HEIGHT: 74 IN | DIASTOLIC BLOOD PRESSURE: 80 MMHG | TEMPERATURE: 98.9 F

## 2022-12-29 DIAGNOSIS — J06.9 VIRAL URI: ICD-10-CM

## 2022-12-29 LAB
FLUAV+FLUBV AG SPEC QL IA: NEGATIVE
INT CON NEG: NORMAL
INT CON POS: NORMAL

## 2022-12-29 PROCEDURE — 87804 INFLUENZA ASSAY W/OPTIC: CPT | Performed by: NURSE PRACTITIONER

## 2022-12-29 PROCEDURE — 0240U HCHG SARS-COV-2 COVID-19 NFCT DS RESP RNA 3 TRGT MIC: CPT

## 2022-12-29 PROCEDURE — 99213 OFFICE O/P EST LOW 20 MIN: CPT | Mod: CS | Performed by: NURSE PRACTITIONER

## 2022-12-29 RX ORDER — ALBUTEROL SULFATE 90 UG/1
2 AEROSOL, METERED RESPIRATORY (INHALATION) EVERY 6 HOURS PRN
Qty: 8.5 G | Refills: 0 | Status: SHIPPED | OUTPATIENT
Start: 2022-12-29

## 2022-12-29 RX ORDER — DEXAMETHASONE SODIUM PHOSPHATE 10 MG/ML
10 INJECTION INTRAMUSCULAR; INTRAVENOUS ONCE
Status: COMPLETED | OUTPATIENT
Start: 2022-12-29 | End: 2022-12-29

## 2022-12-29 RX ADMIN — DEXAMETHASONE SODIUM PHOSPHATE 10 MG: 10 INJECTION INTRAMUSCULAR; INTRAVENOUS at 18:08

## 2022-12-30 LAB
FLUAV RNA SPEC QL NAA+PROBE: NEGATIVE
FLUBV RNA SPEC QL NAA+PROBE: NEGATIVE
SARS-COV-2 RNA RESP QL NAA+PROBE: NOTDETECTED
SPECIMEN SOURCE: NORMAL

## 2022-12-30 NOTE — PROGRESS NOTES
Patient has consented to treatment and for use of patient information for treatment and billing purposes.    Date: 12/29/22     Arrival Mode: Private Vehicle / Ambulatory    Chief Complaint:    Chief Complaint   Patient presents with    Cough     X 3 days, cough, shortness of breath, congestion, runny nose, fever. Negative home covid test was negative.         History of Present Illness: 32 y.o. male  presents to clinic with 3-day history of cough, shortness of breath nasal congestion rhinorrhea.  Patient states he has been using Robitussin for the cough states it is helpful and does suppress his cough although he states over the past 24 hours he has had a very painful cough.  States that with coughing he has pain into his bronchioles and lower throat.  Patient has also been having fevers and body aches as well.  Denies any severe shortness of breath chest pain or leg swelling.  No nausea vomiting diarrhea.  No known sick contacts.  Patient does admit to mild wheezing mostly at night.  No history of asthma COPD or smoking.      ROS:  As stated in HPI     Pertinent Medical History:    History reviewed. No pertinent past medical history.     Pertinent Surgical History:    Past Surgical History:   Procedure Laterality Date    REPAIR, KNEE, ACL Left 2019        Current  Medications:  No current outpatient medications on file prior to visit.     No current facility-administered medications on file prior to visit.        Allergies:     Patient has no known allergies.     Social History:   Social History     Socioeconomic History    Marital status:      Spouse name: Not on file    Number of children: Not on file    Years of education: Not on file    Highest education level: Not on file   Occupational History    Not on file   Tobacco Use    Smoking status: Never    Smokeless tobacco: Never   Vaping Use    Vaping Use: Never used   Substance and Sexual Activity    Alcohol use: Yes     Comment: Occasionally    Drug use:  Not Currently    Sexual activity: Not on file   Other Topics Concern    Not on file   Social History Narrative    Not on file     Social Determinants of Health     Financial Resource Strain: Not on file   Food Insecurity: Not on file   Transportation Needs: Not on file   Physical Activity: Not on file   Stress: Not on file   Social Connections: Not on file   Intimate Partner Violence: Not on file   Housing Stability: Not on file        No LMP for male patient.       Physical Exam:  Vitals:    12/29/22 1740   BP: 122/80   Pulse: 94   Resp: 16   Temp: 37.2 °C (98.9 °F)   SpO2: 95%        Physical Exam  Constitutional:       General: He is awake.      Appearance: Normal appearance. He is not ill-appearing, toxic-appearing or diaphoretic.   HENT:      Head: Normocephalic and atraumatic.      Right Ear: Tympanic membrane, ear canal and external ear normal.      Left Ear: Tympanic membrane, ear canal and external ear normal.      Nose: Rhinorrhea present. Rhinorrhea is clear.      Mouth/Throat:      Lips: Pink.      Mouth: Mucous membranes are moist.      Tongue: No lesions.      Palate: No lesions.      Pharynx: Posterior oropharyngeal erythema present. No oropharyngeal exudate or uvula swelling.      Tonsils: No tonsillar exudate or tonsillar abscesses.   Eyes:      General: Lids are normal. Gaze aligned appropriately. No allergic shiner or scleral icterus.     Extraocular Movements: Extraocular movements intact.      Conjunctiva/sclera: Conjunctivae normal.      Pupils: Pupils are equal, round, and reactive to light.   Cardiovascular:      Rate and Rhythm: Normal rate and regular rhythm.      Pulses:           Radial pulses are 2+ on the right side and 2+ on the left side.      Heart sounds: Normal heart sounds.   Pulmonary:      Effort: Pulmonary effort is normal.      Breath sounds: Normal breath sounds and air entry. No decreased breath sounds, wheezing, rhonchi or rales.   Abdominal:      General: Abdomen is flat.  Bowel sounds are normal.      Palpations: Abdomen is soft.      Tenderness: There is no abdominal tenderness.   Musculoskeletal:      Right lower leg: No edema.      Left lower leg: No edema.   Lymphadenopathy:      Cervical: No cervical adenopathy.   Skin:     General: Skin is warm.      Capillary Refill: Capillary refill takes less than 2 seconds.      Coloration: Skin is not cyanotic or pale.   Neurological:      Mental Status: He is alert and oriented to person, place, and time.      Gait: Gait is intact.   Psychiatric:         Behavior: Behavior normal. Behavior is cooperative.        Diagnostics:    PSC influenza negative  Pcr covid flu     Medical Decision Making:  I personally reviewed prior external notes and test results pertinent to today's visit.   Shared decision-making was utilized with patient did develop treatment plan and clinic course. Pleasant, 32 y.o. male [facility] clinic on day 3 of viral URI-like symptoms.  Did obtain a in clinic influenza which was negative.  Due to suspect likely influenza or COVID-19 we will obtain a PCR for confirmation.  Discussed test results will not change treatment plan.  Patient did verbalize understanding.  No noted bacterial foci that would indicate a need antibiotics at this time.  Due to significant painful coughing will give in clinic Decadron this will also help with subjective wheezing.  We will also send for an albuterol inhaler for subjective wheezing.    Did advise patient on conservative measures for management of symptoms.  Patient is agreeable to pursue adequate rest, adequate hydration, saltwater gargle and Neti pot for any symptoms of upper respiratory congestion.  Over-the-counter analgesia and antipyretics on a p.r.n. basis as needed for pain and fever.  Did discuss over-the-counter cough medications.      Patient will monitor symptoms closely for worsening and is advised to seek further evaluation the emergency room if alarm signs or symptoms arise.   Patient states understanding and verbalizes agreement with this plan of care.    Plan:    1. Viral URI    - albuterol 108 (90 Base) MCG/ACT Aero Soln inhalation aerosol; Inhale 2 Puffs every 6 hours as needed for Shortness of Breath.  Dispense: 8.5 g; Refill: 0  - dexamethasone (DECADRON) injection (check route below) 10 mg  - POCT Influenza A/B  - CoV-2 and Flu A/B by PCR (24 hour In-House): Collect [facility] swab in [facility]; Future       Disposition:  Patient was discharged in stable condition.    Voice Recognition Disclaimer: Portions of this document were created using voice recognition software. The software does have a chance of producing errors of grammar and possibly content. I have made every reasonable attempt to correct obvious errors, but there may be errors of grammar and possibly content that I did not discover before finalizing the documentation.    JORGE Gama.

## 2023-01-17 ENCOUNTER — OFFICE VISIT (OUTPATIENT)
Dept: MEDICAL GROUP | Facility: MEDICAL CENTER | Age: 33
End: 2023-01-17
Payer: COMMERCIAL

## 2023-01-17 VITALS
WEIGHT: 240.3 LBS | HEIGHT: 74 IN | RESPIRATION RATE: 16 BRPM | HEART RATE: 71 BPM | BODY MASS INDEX: 30.84 KG/M2 | SYSTOLIC BLOOD PRESSURE: 114 MMHG | TEMPERATURE: 98 F | DIASTOLIC BLOOD PRESSURE: 70 MMHG | OXYGEN SATURATION: 97 %

## 2023-01-17 DIAGNOSIS — G25.89 SCAPULAR DYSKINESIS: ICD-10-CM

## 2023-01-17 DIAGNOSIS — Z00.00 PREVENTATIVE HEALTH CARE: ICD-10-CM

## 2023-01-17 DIAGNOSIS — R74.01 ELEVATED ALT MEASUREMENT: ICD-10-CM

## 2023-01-17 DIAGNOSIS — E78.2 MIXED HYPERLIPIDEMIA: ICD-10-CM

## 2023-01-17 DIAGNOSIS — E66.9 OBESITY (BMI 30.0-34.9): ICD-10-CM

## 2023-01-17 DIAGNOSIS — R09.81 SINUS CONGESTION: ICD-10-CM

## 2023-01-17 PROCEDURE — 99214 OFFICE O/P EST MOD 30 MIN: CPT | Performed by: STUDENT IN AN ORGANIZED HEALTH CARE EDUCATION/TRAINING PROGRAM

## 2023-01-17 RX ORDER — METHYLPREDNISOLONE 4 MG/1
TABLET ORAL
Qty: 21 TABLET | Refills: 0 | Status: SHIPPED | OUTPATIENT
Start: 2023-01-17 | End: 2023-03-21

## 2023-01-17 NOTE — PROGRESS NOTES
Subjective:     CC: cough, congestion    HPI:   Deangelo presents today for cough, congestion    Problem   Sinus Congestion    Acute condition.    Character: persistent sinus congestion, mild dry cough  Onset: 3 weeks ago  Location: sinuses  Duration: constant  Exacerbating factors: none  Relieving factors: none, tried Sudafed without relief  Associated symptoms: dry cough  Severity: persistent     Scapular Dyskinesis    Chronic condition. He has left scapular dyskinesia. Followed by orthopedic surgery. He would like to redo physical therapy.     Preventative Health Care    Patient is due for annual labs.     Elevated Alt Measurement    Acute issue. ALT 60s.     Mixed Hyperlipidemia    Chronic condition. Stable with healthy lifestyle management.     Obesity (Bmi 30.0-34.9)    Chronic condition. He tries to eat healthy in general. Drinks 1 soda a day. He lifts weight, running, golf regularly.         Current Outpatient Medications Ordered in Epic   Medication Sig Dispense Refill    methylPREDNISolone (MEDROL DOSEPAK) 4 MG Tablet Therapy Pack As directed on the packaging label. 21 Tablet 0    albuterol 108 (90 Base) MCG/ACT Aero Soln inhalation aerosol Inhale 2 Puffs every 6 hours as needed for Shortness of Breath. 8.5 g 0     No current Epic-ordered facility-administered medications on file.     SH: works as , moved from [place] to [place] around 07/2021, no regular contact with chemicals, occasional cigar, social EtOH, denies illicit drugs     Health Maintenance: reviewed and discussed with patient  Vaccines: flu (reports possibly received at outside pharmacy), Tdap 02/2021, Moderna COVID #3 received 03/2022     ROS:  Gen: no fevers/chills  ENT: + sinus congestion  Pulm: no sob, + cough  CV: no chest pain, no palpitations  GI: no nausea/vomiting, no diarrhea  Skin: no rash  Neuro: no headaches, no numbness/tingling    Objective:     Exam:  /70 (BP Location: Left arm, Patient Position:  "Sitting, BP Cuff Size: Adult)   Pulse 71   Temp 36.7 °C (98 °F) (Temporal)   Resp 16   Ht 1.88 m (6' 2\")   Wt 109 kg (240 lb 4.8 oz)   SpO2 97%   BMI 30.85 kg/m²  Body mass index is 30.85 kg/m².    General: Normal appearing. No distress.  HEENT: Normocephalic. Ear canals are clear bilaterally, tympanic membranes are benign, nasal mucosa benign, oropharynx is without erythema, edema or exudates.  Pulmonary: Clear to ausculation. Normal effort. No rales, ronchi, or wheezing.  Cardiovascular: Regular rate and rhythm without murmur.  Neurologic: Grossly nonfocal  MSK: Left shoulder with mild limited ROM due to pain.    Assessment & Plan:     32 y.o. male with the following -     1. Sinus congestion  Acute condition, persistent for 3 weeks now. Plan to trial Medrol dosepak per order. Advised to do nasal saline rinsing followed by Flonase nasal spray.  - methylPREDNISolone (MEDROL DOSEPAK) 4 MG Tablet Therapy Pack; As directed on the packaging label.  Dispense: 21 Tablet; Refill: 0    2. Scapular dyskinesis  Chronic condition, persistent.  - Referral to Physical Therapy    3. Mixed hyperlipidemia  Chronic condition. Plan to reassess.   - Lipid Profile; Future  - TSH WITH REFLEX TO FT4; Future    4. Obesity (BMI 30.0-34.9)  - Comp Metabolic Panel; Future  - Lipid Profile; Future  - TSH WITH REFLEX TO FT4; Future    5. Elevated ALT measurement  - Comp Metabolic Panel; Future    6. Preventative health care  - Comp Metabolic Panel; Future  - Lipid Profile; Future  - TSH WITH REFLEX TO FT4; Future  - HEP B SURFACE AB; Future      Return in about 2 months (around 3/17/2023) for annual physical.    Please note that this dictation was created using voice recognition software. I have made every reasonable attempt to correct obvious errors, but I expect that there are errors of grammar and possibly content that I did not discover before finalizing the note.        "

## 2023-01-23 DIAGNOSIS — G25.89 SCAPULAR DYSKINESIS: ICD-10-CM

## 2023-02-28 ENCOUNTER — OFFICE VISIT (OUTPATIENT)
Dept: MEDICAL GROUP | Facility: MEDICAL CENTER | Age: 33
End: 2023-02-28
Payer: COMMERCIAL

## 2023-02-28 VITALS
SYSTOLIC BLOOD PRESSURE: 124 MMHG | OXYGEN SATURATION: 95 % | RESPIRATION RATE: 16 BRPM | DIASTOLIC BLOOD PRESSURE: 68 MMHG | TEMPERATURE: 98.6 F | HEIGHT: 74 IN | HEART RATE: 72 BPM | WEIGHT: 237 LBS | BODY MASS INDEX: 30.42 KG/M2

## 2023-02-28 DIAGNOSIS — J02.9 SORE THROAT: ICD-10-CM

## 2023-02-28 DIAGNOSIS — G25.89 SCAPULAR DYSKINESIS: ICD-10-CM

## 2023-02-28 DIAGNOSIS — J02.0 STREP PHARYNGITIS: ICD-10-CM

## 2023-02-28 DIAGNOSIS — L03.012 PARONYCHIA OF LEFT MIDDLE FINGER: ICD-10-CM

## 2023-02-28 LAB
INT CON NEG: NEGATIVE
INT CON POS: POSITIVE
S PYO AG THROAT QL: POSITIVE

## 2023-02-28 PROCEDURE — 87880 STREP A ASSAY W/OPTIC: CPT | Performed by: STUDENT IN AN ORGANIZED HEALTH CARE EDUCATION/TRAINING PROGRAM

## 2023-02-28 PROCEDURE — 99214 OFFICE O/P EST MOD 30 MIN: CPT | Performed by: STUDENT IN AN ORGANIZED HEALTH CARE EDUCATION/TRAINING PROGRAM

## 2023-02-28 RX ORDER — CEPHALEXIN 500 MG/1
500 CAPSULE ORAL 2 TIMES DAILY
Qty: 20 CAPSULE | Refills: 0 | Status: SHIPPED | OUTPATIENT
Start: 2023-02-28 | End: 2023-03-10

## 2023-02-28 NOTE — PROGRESS NOTES
Subjective:     CC: sore throat    HPI:   Deangelo presents today for sore throat    Problem   Paronychia of Left Middle Finger    Acute condition.    Character: aching pain  Onset: one week  Location: left middle finger  Duration: constant  Exacerbating factors:  Relieving factors:  Associated symptoms:  Severity:     Scapular Dyskinesis    Chronic condition. He has left scapular dyskinesia. Followed by orthopedic surgery. He would like to redo physical therapy.    Character: feels unstable  Onset: several years  Location: left shoulder  Duration: intermittent  Exacerbating factors: most left shoulder movement  Relieving factors: resting  Associated symptoms: none  Severity: persistent     Sore Throat    Acute issue.    Date of symptoms onset: Thursday 2/23/2023  Symptoms:   fatigue, palpitations, subjective fever/chills (did not check temp at home), dry cough throughout the day, SOB while wearing mask, loss of smell/taste, impaired sleep  Date of COVID positive test:  Medications tried: OTC cough medicine, Tylenol with mild relief  Home max temperature:   Home O2 saturation:  He has been able to tolerate PO but with reduced appetite. Normal urination.    COVID vaccination status:  Potential exposure:         Current Outpatient Medications Ordered in Epic   Medication Sig Dispense Refill    cephALEXin (KEFLEX) 500 MG Cap Take 1 Capsule by mouth 2 times a day for 10 days. 20 Capsule 0    methylPREDNISolone (MEDROL DOSEPAK) 4 MG Tablet Therapy Pack As directed on the packaging label. 21 Tablet 0    albuterol 108 (90 Base) MCG/ACT Aero Soln inhalation aerosol Inhale 2 Puffs every 6 hours as needed for Shortness of Breath. 8.5 g 0     No current Epic-ordered facility-administered medications on file.     SH: works as , moved from [place] to [place] around 07/2021, no regular contact with chemicals, occasional cigar, social EtOH, denies illicit drugs     Health Maintenance: reviewed and discussed  "with patient  Vaccines: flu (reports possibly received at outside pharmacy), Tdap 02/2021, Moderna COVID #3 received 03/2022     ROS:  Gen: no fevers/chills  ENT: + sore throat  Pulm: no sob, + cough  CV: no chest pain, no palpitations  GI: no nausea/vomiting, no diarrhea  Skin: no rash  Neuro: no headaches, no numbness/tingling    Objective:     Exam:  /68 (BP Location: Left arm, Patient Position: Sitting, BP Cuff Size: Adult)   Pulse 72   Temp 37 °C (98.6 °F) (Temporal)   Resp 16   Ht 1.88 m (6' 2\")   Wt 108 kg (237 lb)   SpO2 95%   BMI 30.43 kg/m²  Body mass index is 30.43 kg/m².    General: Normal appearing. No distress.  HEENT: Normocephalic. Ear canals are clear bilaterally, tympanic membranes are benign, nasal mucosa benign, oropharynx is with erythema, without edema or exudates.  Pulmonary: Clear to ausculation. Normal effort. No rales, ronchi, or wheezing.  Cardiovascular: Regular rate and rhythm without murmur.  Neurologic: Grossly nonfocal  Skin: Left middle fingernail with mild edema and erythema at the proximal nail fold.  MSK: Left shoulder with normal ROM, negative internal lag test, positive [place]    Labs:   POC strep positive    XR left shoulder 04/2022: no fracture dislocations or other abnormalities    Assessment & Plan:     32 y.o. male with the following -     1. Strep pharyngitis  Acute condition, stable. Nontoxic appearing. Rx Keflex per order to treat both strep throat and paronychia, potential adverse reactions discussed. Over the counter Cepacol as needed for sore throat.  - cont symptomatic therapy as needed: OTC Tylenol as needed for pain/headache/fever, antihistamine/decongestant as needed for runny nose/congestion. Call or return to clinic prn if these symptoms worsen or fail to improve as anticipated.  - advised to maintain adequate rest, hydration, regular handwashing, facemask, social distancing, avoidance of respiratory irritants, soft diet  - may return to work after " completing one full day of treatment, if afebrile and otherwise well  - cephALEXin (KEFLEX) 500 MG Cap; Take 1 Capsule by mouth 2 times a day for 10 days.  Dispense: 20 Capsule; Refill: 0    2. Sore throat  - POCT Rapid Strep A    3. Paronychia of left middle finger  Acute condition, persistent.  - Rx Keflex per order  - cephALEXin (KEFLEX) 500 MG Cap; Take 1 Capsule by mouth 2 times a day for 10 days.  Dispense: 20 Capsule; Refill: 0    4. Scapular dyskinesis  Chronic condition, persistent symptoms with shoulder instability. Previous XR without acute pathologies. Previously seen by orthopedics and was recommended to do physical therapy which he has done. Symptoms have failed to improve with conservative measures. Plan to evaluate with MRI per order to rule out chronic soft tissue injury.  - MR-SHOULDER-W/O LEFT; Future      Return if symptoms worsen or fail to improve.    Please note that this dictation was created using voice recognition software. I have made every reasonable attempt to correct obvious errors, but I expect that there are errors of grammar and possibly content that I did not discover before finalizing the note.

## 2023-03-14 ENCOUNTER — APPOINTMENT (OUTPATIENT)
Dept: RADIOLOGY | Facility: MEDICAL CENTER | Age: 33
End: 2023-03-14
Attending: STUDENT IN AN ORGANIZED HEALTH CARE EDUCATION/TRAINING PROGRAM
Payer: COMMERCIAL

## 2023-03-21 ENCOUNTER — OFFICE VISIT (OUTPATIENT)
Dept: MEDICAL GROUP | Facility: MEDICAL CENTER | Age: 33
End: 2023-03-21
Payer: COMMERCIAL

## 2023-03-21 VITALS
WEIGHT: 242.51 LBS | SYSTOLIC BLOOD PRESSURE: 114 MMHG | BODY MASS INDEX: 31.12 KG/M2 | HEART RATE: 75 BPM | OXYGEN SATURATION: 96 % | HEIGHT: 74 IN | TEMPERATURE: 97.3 F | RESPIRATION RATE: 16 BRPM | DIASTOLIC BLOOD PRESSURE: 68 MMHG

## 2023-03-21 DIAGNOSIS — K22.4 JACKHAMMER ESOPHAGUS: ICD-10-CM

## 2023-03-21 DIAGNOSIS — Z00.00 PREVENTATIVE HEALTH CARE: Primary | ICD-10-CM

## 2023-03-21 DIAGNOSIS — R74.01 ELEVATED ALT MEASUREMENT: ICD-10-CM

## 2023-03-21 DIAGNOSIS — E66.9 OBESITY (BMI 30.0-34.9): ICD-10-CM

## 2023-03-21 DIAGNOSIS — K21.9 CHRONIC GERD: ICD-10-CM

## 2023-03-21 DIAGNOSIS — E78.2 MIXED HYPERLIPIDEMIA: ICD-10-CM

## 2023-03-21 DIAGNOSIS — K76.0 FATTY LIVER: ICD-10-CM

## 2023-03-21 PROBLEM — R09.81 SINUS CONGESTION: Status: RESOLVED | Noted: 2023-01-17 | Resolved: 2023-03-21

## 2023-03-21 PROBLEM — J02.9 SORE THROAT: Status: RESOLVED | Noted: 2022-01-06 | Resolved: 2023-03-21

## 2023-03-21 PROBLEM — L03.012 PARONYCHIA OF LEFT MIDDLE FINGER: Status: RESOLVED | Noted: 2023-02-28 | Resolved: 2023-03-21

## 2023-03-21 PROCEDURE — 99395 PREV VISIT EST AGE 18-39: CPT | Performed by: STUDENT IN AN ORGANIZED HEALTH CARE EDUCATION/TRAINING PROGRAM

## 2023-03-21 ASSESSMENT — PATIENT HEALTH QUESTIONNAIRE - PHQ9: CLINICAL INTERPRETATION OF PHQ2 SCORE: 0

## 2023-03-21 NOTE — PROGRESS NOTES
Subjective:     CC:   Chief Complaint   Patient presents with    Annual Exam       HPI:   Deangelo Tovar is a 32 y.o. male who presents for an annual exam. He is feeling well and has no complaints.    Problem   Preventative Health Care    Patient is here for annual physical exam. Feels well overall.     Fatty Liver    Chronic condition. US 01/2022 showed fatty liver.     Elevated Alt Measurement    Chronic condition. ALT 60s.     Mixed Hyperlipidemia    Chronic condition. Stable with healthy lifestyle management.     Obesity (Bmi 30.0-34.9)    Chronic condition. He tries to eat healthy in general. Drinks 1 soda a day. He lifts weight, running, golf regularly.     Chronic Gerd    Chronic condition. Symptoms have been well controlled without medications.     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 Symptoms have been well controlled without medications.     Paronychia of Left Middle Finger (Resolved)    Acute condition.    Character: aching pain  Onset: one week  Location: left middle finger  Duration: constant  Exacerbating factors:  Relieving factors:  Associated symptoms:  Severity:     Sinus Congestion (Resolved)    Acute condition.    Character: persistent sinus congestion, mild dry cough  Onset: 3 weeks ago  Location: sinuses  Duration: constant  Exacerbating factors: none  Relieving factors: none, tried Sudafed without relief  Associated symptoms: dry cough  Severity: persistent     Sore Throat (Resolved)    Acute issue.    Date of symptoms onset: Thursday 2/23/2023  Symptoms:   fatigue, palpitations, subjective fever/chills (did not check temp at home), dry cough throughout the day, SOB while wearing mask, loss of smell/taste, impaired sleep  Date of COVID positive test:  Medications tried: OTC cough medicine, Tylenol with mild relief  Home max temperature:   Home O2 saturation:  He has been able to tolerate PO but with reduced appetite. Normal urination.    COVID vaccination  status:  Potential exposure:         Health Maintenance  Diet: eats healthy in general  Exercise: regular exercise  Substance Abuse: n/a  Safe in relationship.   Seat belts, bike helmet, gun safety discussed.  Sun protection used.    Cancer screening  Colorectal Cancer Screening: n/a  Lung Cancer Screening: n/a  Prostate Cancer Screening/PSA: n/a    Infectious disease screening/Immunizations  --STI Screening: n/a  --Practices safe sex.  --HIV Screening: done 2021  --Hepatitis C Screening: done 2021  --Immunizations:    Influenza: declined   HPV:  n/a   Tetanus: done 02/2021   Shingles: n/a   Pneumococcal : n/a   COVID-19: Moderna COVID #3 received 02/2022 (no plans for booster)  Other immunizations: n/a    He  has no past medical history on file.  He  has a past surgical history that includes repair, knee, acl (Left, 2019).  Family History   Problem Relation Age of Onset    Diabetes Father     Hypertension Father      Social History     Tobacco Use    Smoking status: Never    Smokeless tobacco: Never   Vaping Use    Vaping Use: Never used   Substance Use Topics    Alcohol use: Yes     Comment: Occasionally    Drug use: Not Currently       Patient Active Problem List    Diagnosis Date Noted    Scapular dyskinesis 01/17/2023    Preventative health care 01/17/2023    Lumbar herniated disc 06/07/2022    COVID-19 01/06/2022    Fatty liver 01/04/2022    Elevated ALT measurement 12/21/2021    Mixed hyperlipidemia 12/17/2021    Obesity (BMI 30.0-34.9) 12/01/2021    Chronic GERD 12/01/2021    Jackhammer esophagus 12/01/2021    Chronic pain of left knee 12/01/2021       Current Outpatient Medications   Medication Sig Dispense Refill    albuterol 108 (90 Base) MCG/ACT Aero Soln inhalation aerosol Inhale 2 Puffs every 6 hours as needed for Shortness of Breath. 8.5 g 0     No current facility-administered medications for this visit.    (including changes today)  Allergies: Patient has no known allergies.    Review of Systems  "  Constitutional: Negative for fever, chills and malaise/fatigue.   HENT: Negative for congestion.    Eyes: Negative for pain.   Respiratory: Negative for cough and shortness of breath.    Cardiovascular: Negative for leg swelling.   Gastrointestinal: Negative for nausea, vomiting, abdominal pain and diarrhea.   Genitourinary: Negative for dysuria and hematuria.   Skin: Negative for rash.   Neurological: Negative for dizziness, focal weakness and headaches.   Endo/Heme/Allergies: Does not bleed easily.   Psychiatric/Behavioral: Negative for depression.  The patient is not nervous/anxious.      Objective:     /68 (BP Location: Left arm, Patient Position: Sitting, BP Cuff Size: Adult)   Pulse 75   Temp 36.3 °C (97.3 °F) (Temporal)   Resp 16   Ht 1.88 m (6' 2\")   Wt 110 kg (242 lb 8.1 oz)   SpO2 96%   BMI 31.14 kg/m²   Body mass index is 31.14 kg/m².  Wt Readings from Last 4 Encounters:   03/21/23 110 kg (242 lb 8.1 oz)   02/28/23 108 kg (237 lb)   01/17/23 109 kg (240 lb 4.8 oz)   12/29/22 109 kg (240 lb)       Physical Exam:  Constitutional: Well-developed and well-nourished. Not diaphoretic. No distress.   Skin: Skin is warm and dry. No rash noted.  Head: Atraumatic without lesions.  Eyes: Conjunctivae and extraocular motions are normal. Pupils are equal, round, and reactive to light. No scleral icterus.   Ears:  External ears unremarkable. Tympanic membranes clear and intact.  Nose: Nares patent. Septum midline. Turbinates without erythema nor edema. No discharge.   Mouth/Throat: Dentition is good. Tongue normal. Oropharynx is clear and moist. Posterior pharynx without erythema or exudates.  Neck: Supple, trachea midline. Normal range of motion. No thyromegaly present. No lymphadenopathy--cervical or supraclavicular.  Cardiovascular: Regular rate and rhythm, S1 and S2 without murmur, rubs, or gallops.    Lungs: Effort normal. Clear to auscultation throughout. No adventitious sounds. No CVA " tenderness.  Abdomen: Soft, non tender, and without distention. Active bowel sounds in all four quadrants. No rebound, guarding, masses or HSM.  : deferred  Rectal: deferred  Prostate: deferred  Extremities: No cyanosis, clubbing, erythema, nor edema. Distal pulses intact and symmetric.   Musculoskeletal: All major joints AROM full in all directions without pain.  Neurological: Alert and oriented x 3. DTRs 2+/3 and symmetric. No cranial nerve deficit. 5/5 myotomes. Sensation intact.  Psychiatric:  Behavior, mood, and affect are appropriate.    Assessment and Plan:     1. Preventative health care  - Recommended age appropriate vaccinations and cancer screening  - Labs per orders.  - Vaccinations reviewed and discussed with patient.  - Counseling about diet, supplements, exercise, skin care.  - Follow up 1 year for annual physical    Comp Metabolic Panel    Lipid Profile    TSH WITH REFLEX TO FT4    HEP B SURFACE AB      2. Mixed hyperlipidemia  Chronic condition. Plan to reassess.    Lipid Profile    TSH WITH REFLEX TO FT4      3. Fatty liver  Chronic condition. Plan to reassess liver function.        4. Elevated ALT measurement  Chronic condition. Plan to reassess liver function.    Comp Metabolic Panel      5. Chronic GERD  Chronic condition, stable without medications.      6. Jackhammer esophagus  Chronic condition, stable without medications.      7. Obesity (BMI 30.0-34.9)  Comp Metabolic Panel    Lipid Profile    TSH WITH REFLEX TO FT4    Patient identified as having weight management issue.  Appropriate orders and counseling given.          Follow-up: Return in about 1 year (around 3/21/2024) for annual physical.

## 2023-06-02 ENCOUNTER — OFFICE VISIT (OUTPATIENT)
Dept: MEDICAL GROUP | Facility: MEDICAL CENTER | Age: 33
End: 2023-06-02
Payer: COMMERCIAL

## 2023-06-02 VITALS
WEIGHT: 231.48 LBS | HEART RATE: 76 BPM | TEMPERATURE: 97.8 F | DIASTOLIC BLOOD PRESSURE: 70 MMHG | HEIGHT: 74 IN | RESPIRATION RATE: 16 BRPM | OXYGEN SATURATION: 95 % | BODY MASS INDEX: 29.71 KG/M2 | SYSTOLIC BLOOD PRESSURE: 122 MMHG

## 2023-06-02 DIAGNOSIS — K21.9 CHRONIC GERD: ICD-10-CM

## 2023-06-02 DIAGNOSIS — Z82.49 FAMILY HISTORY OF HEART DISEASE: ICD-10-CM

## 2023-06-02 DIAGNOSIS — K22.4 JACKHAMMER ESOPHAGUS: ICD-10-CM

## 2023-06-02 DIAGNOSIS — R07.89 OTHER CHEST PAIN: ICD-10-CM

## 2023-06-02 PROCEDURE — 3078F DIAST BP <80 MM HG: CPT | Performed by: STUDENT IN AN ORGANIZED HEALTH CARE EDUCATION/TRAINING PROGRAM

## 2023-06-02 PROCEDURE — 3074F SYST BP LT 130 MM HG: CPT | Performed by: STUDENT IN AN ORGANIZED HEALTH CARE EDUCATION/TRAINING PROGRAM

## 2023-06-02 PROCEDURE — 99214 OFFICE O/P EST MOD 30 MIN: CPT | Performed by: STUDENT IN AN ORGANIZED HEALTH CARE EDUCATION/TRAINING PROGRAM

## 2023-06-02 NOTE — PROGRESS NOTES
"Subjective:     CC: chest pain    HPI:   Deangelo presents today for chest pain    Problem   Other Chest Pain    Acute condition.    Character: aching pain  Onset: 1+ month  Location: left side of chest  Duration: intermittent  Exacerbating factors: unknown  Relieving factors: unknown  Associated symptoms: radiates to left shoulder  Severity: persistent     Family History of Heart Disease    He reports paternal grandfather had heart attack.       Chronic Gerd    Chronic condition. Symptoms have been well controlled without medications.       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 Symptoms have been well controlled without medications.           Current Outpatient Medications Ordered in Epic   Medication Sig Dispense Refill    albuterol 108 (90 Base) MCG/ACT Aero Soln inhalation aerosol Inhale 2 Puffs every 6 hours as needed for Shortness of Breath. 8.5 g 0     No current Epic-ordered facility-administered medications on file.     ROS:  See HPI    Objective:     Exam:  /70   Pulse 76   Temp 36.6 °C (97.8 °F) (Temporal)   Resp 16   Ht 1.88 m (6' 2\")   Wt 105 kg (231 lb 7.7 oz)   SpO2 95%   BMI 29.72 kg/m²  Body mass index is 29.72 kg/m².    Gen: Alert and oriented, No apparent distress.  Lungs: Normal effort, CTA bilaterally, no wheezes, rhonchi, or rales  CV: Regular rate and rhythm. No murmurs, rubs, or gallops.  Ext: No clubbing, cyanosis, edema.    Labs: no new lab results since last visit    EKG today per my interpretation: normal sinus rhythm, HR 54, , Qtc 430, no acute ischemic changes, no prior EKGs for comparison    Assessment & Plan:     33 y.o. male with the following -     1. Other chest pain  Acute condition, stable. Nontoxic appearing. Lower suspicion for cardiac etiology at this time but patient is concerned and would like to rule out cardiac disease which is reasonable. Suspect this is more GI related given his long-standing GERD and Jackhammer " esophagus. Recommend to take pantoprazole as needed while Follow up testing per orders.  - TROPONIN; Future  - Cardiac Stress Test Treadmill Only  - EKG    2. Family history of heart disease  - plan as above    3. Chronic GERD  Chronic condition, stable.  - cont current regimen: pantoprazole as needed    4. Jackhammer esophagus  Chronic condition, stable.  - cont current regimen: pantoprazole as needed    Return if symptoms worsen or fail to improve.    Please note that this dictation was created using voice recognition software. I have made every reasonable attempt to correct obvious errors, but I expect that there are errors of grammar and possibly content that I did not discover before finalizing the note.

## 2023-06-19 ENCOUNTER — HOSPITAL ENCOUNTER (OUTPATIENT)
Dept: RADIOLOGY | Facility: MEDICAL CENTER | Age: 33
End: 2023-06-19
Attending: STUDENT IN AN ORGANIZED HEALTH CARE EDUCATION/TRAINING PROGRAM
Payer: COMMERCIAL

## 2023-07-25 ENCOUNTER — OFFICE VISIT (OUTPATIENT)
Dept: MEDICAL GROUP | Facility: MEDICAL CENTER | Age: 33
End: 2023-07-25
Payer: COMMERCIAL

## 2023-07-25 VITALS
TEMPERATURE: 97.3 F | HEART RATE: 64 BPM | BODY MASS INDEX: 30.27 KG/M2 | DIASTOLIC BLOOD PRESSURE: 72 MMHG | HEIGHT: 74 IN | OXYGEN SATURATION: 95 % | WEIGHT: 235.89 LBS | SYSTOLIC BLOOD PRESSURE: 120 MMHG

## 2023-07-25 DIAGNOSIS — M25.532 LEFT WRIST PAIN: ICD-10-CM

## 2023-07-25 DIAGNOSIS — M54.2 ANTERIOR NECK PAIN: ICD-10-CM

## 2023-07-25 DIAGNOSIS — L60.0 INGROWN TOENAIL OF LEFT FOOT WITH INFECTION: ICD-10-CM

## 2023-07-25 PROCEDURE — 99214 OFFICE O/P EST MOD 30 MIN: CPT | Performed by: STUDENT IN AN ORGANIZED HEALTH CARE EDUCATION/TRAINING PROGRAM

## 2023-07-25 PROCEDURE — 3078F DIAST BP <80 MM HG: CPT | Performed by: STUDENT IN AN ORGANIZED HEALTH CARE EDUCATION/TRAINING PROGRAM

## 2023-07-25 PROCEDURE — 3074F SYST BP LT 130 MM HG: CPT | Performed by: STUDENT IN AN ORGANIZED HEALTH CARE EDUCATION/TRAINING PROGRAM

## 2023-07-25 RX ORDER — DOXYCYCLINE HYCLATE 100 MG
100 TABLET ORAL 2 TIMES DAILY
Qty: 14 TABLET | Refills: 0 | Status: SHIPPED | OUTPATIENT
Start: 2023-07-25 | End: 2023-08-01

## 2023-07-25 NOTE — PROGRESS NOTES
"Subjective:     CC: pain, ingrown toenail    HPI:   Deangelo presents today for pain, ingrown toenail    Problem   Anterior Neck Pain    Acute condition.     Character: aching pain  Onset: several days ago  Location: anterior left neck  Duration: constant  Exacerbating factors: none  Relieving factors: none  Associated symptoms: denies fever  Severity: persistent     Left Wrist Pain    Acute condition.    Character: aching pain  Onset: last night  Location: left wrist  Duration: constant  Exacerbating factors: moving wrist certain way  Relieving factors: none  Associated symptoms: none  Severity: persistent     Ingrown Toenail of Left Foot With Infection    Chronic condition.    Character: aching pain  Onset: past week  Location: left great toe  Duration: constant  Exacerbating factors: pressure  Relieving factors: none  Associated symptoms: pus drainage  Severity: persistent         Current Outpatient Medications Ordered in Epic   Medication Sig Dispense Refill    doxycycline (VIBRAMYCIN) 100 MG Tab Take 1 Tablet by mouth 2 times a day for 7 days. 14 Tablet 0    albuterol 108 (90 Base) MCG/ACT Aero Soln inhalation aerosol Inhale 2 Puffs every 6 hours as needed for Shortness of Breath. 8.5 g 0     No current Epic-ordered facility-administered medications on file.     ROS:  See HPI    Objective:     Exam:  /72 (BP Location: Left arm, Patient Position: Sitting, BP Cuff Size: Adult)   Pulse 64   Temp 36.3 °C (97.3 °F) (Temporal)   Ht 1.88 m (6' 2\")   Wt 107 kg (235 lb 14.3 oz)   SpO2 95%   BMI 30.29 kg/m²  Body mass index is 30.29 kg/m².    Gen: Alert and oriented, No apparent distress.  Neck: Neck is supple. There is mild prominence to left sided thyroid.  Lungs: Normal effort, CTA bilaterally, no wheezes, rhonchi, or rales  CV: Regular rate and rhythm. No murmurs, rubs, or gallops.  Ext: Left great toe lateral border with mild erythema and edematous with mild infection. There is mild TTP to left wrist radial " aspect but otherwise normal ROM.    Labs: no new lab results since last visit    Assessment & Plan:     33 y.o. male with the following -     1. Anterior neck pain  Acute condition, persistent. Suspect viral syndrome vs thyroiditis. Advised to take ibuprofen 600-800mg as needed for pain/swelling for now. Will follow up with patient in a few days and reassess based on his symptoms. He will also complete previously ordered labs. If symptoms not improving, will consider completing thyroid ultrasound.  - US-THYROID; Future    2. Ingrown toenail of left foot with infection  Acute on chronic condition, persistent. Treat with doxycycline per order due to history of MRSA infection in right foot infection and he also had pus expressed from current toe.  - doxycycline (VIBRAMYCIN) 100 MG Tab; Take 1 Tablet by mouth 2 times a day for 7 days.  Dispense: 14 Tablet; Refill: 0    3. Left wrist pain  Acute condition. This appears to be soft tissue injury at this time. Advised to taking ibuprofen 600-800mg every 8 hours as needed for pain/swelling, wear wrist splint, avoid exacerbating activities.    Return if symptoms worsen or fail to improve.    Please note that this dictation was created using voice recognition software. I have made every reasonable attempt to correct obvious errors, but I expect that there are errors of grammar and possibly content that I did not discover before finalizing the note.

## 2023-07-27 ENCOUNTER — HOSPITAL ENCOUNTER (OUTPATIENT)
Dept: LAB | Facility: MEDICAL CENTER | Age: 33
End: 2023-07-27
Attending: STUDENT IN AN ORGANIZED HEALTH CARE EDUCATION/TRAINING PROGRAM
Payer: COMMERCIAL

## 2023-07-27 DIAGNOSIS — R07.89 OTHER CHEST PAIN: ICD-10-CM

## 2023-07-27 DIAGNOSIS — Z00.00 PREVENTATIVE HEALTH CARE: ICD-10-CM

## 2023-07-27 DIAGNOSIS — R74.01 ELEVATED ALT MEASUREMENT: ICD-10-CM

## 2023-07-27 DIAGNOSIS — E66.9 OBESITY (BMI 30.0-34.9): ICD-10-CM

## 2023-07-27 DIAGNOSIS — E78.2 MIXED HYPERLIPIDEMIA: ICD-10-CM

## 2023-07-27 LAB
ALBUMIN SERPL BCP-MCNC: 4.4 G/DL (ref 3.2–4.9)
ALBUMIN/GLOB SERPL: 1.7 G/DL
ALP SERPL-CCNC: 59 U/L (ref 30–99)
ALT SERPL-CCNC: 46 U/L (ref 2–50)
ANION GAP SERPL CALC-SCNC: 12 MMOL/L (ref 7–16)
AST SERPL-CCNC: 27 U/L (ref 12–45)
BILIRUB SERPL-MCNC: 0.5 MG/DL (ref 0.1–1.5)
BUN SERPL-MCNC: 19 MG/DL (ref 8–22)
CALCIUM ALBUM COR SERPL-MCNC: 9.3 MG/DL (ref 8.5–10.5)
CALCIUM SERPL-MCNC: 9.6 MG/DL (ref 8.4–10.2)
CHLORIDE SERPL-SCNC: 104 MMOL/L (ref 96–112)
CHOLEST SERPL-MCNC: 204 MG/DL (ref 100–199)
CO2 SERPL-SCNC: 24 MMOL/L (ref 20–33)
CREAT SERPL-MCNC: 1.17 MG/DL (ref 0.5–1.4)
FASTING STATUS PATIENT QL REPORTED: NORMAL
GFR SERPLBLD CREATININE-BSD FMLA CKD-EPI: 84 ML/MIN/1.73 M 2
GLOBULIN SER CALC-MCNC: 2.6 G/DL (ref 1.9–3.5)
GLUCOSE SERPL-MCNC: 102 MG/DL (ref 65–99)
HBV SURFACE AB SERPL IA-ACNC: 83.8 MIU/ML (ref 0–10)
HDLC SERPL-MCNC: 40 MG/DL
LDLC SERPL CALC-MCNC: 133 MG/DL
POTASSIUM SERPL-SCNC: 3.8 MMOL/L (ref 3.6–5.5)
PROT SERPL-MCNC: 7 G/DL (ref 6–8.2)
SODIUM SERPL-SCNC: 140 MMOL/L (ref 135–145)
TRIGL SERPL-MCNC: 153 MG/DL (ref 0–149)
TROPONIN T SERPL-MCNC: <6 NG/L (ref 6–19)
TSH SERPL DL<=0.005 MIU/L-ACNC: 3.01 UIU/ML (ref 0.38–5.33)

## 2023-07-27 PROCEDURE — 80061 LIPID PANEL: CPT

## 2023-07-27 PROCEDURE — 86706 HEP B SURFACE ANTIBODY: CPT

## 2023-07-27 PROCEDURE — 36415 COLL VENOUS BLD VENIPUNCTURE: CPT

## 2023-07-27 PROCEDURE — 84443 ASSAY THYROID STIM HORMONE: CPT

## 2023-07-27 PROCEDURE — 84484 ASSAY OF TROPONIN QUANT: CPT

## 2023-07-27 PROCEDURE — 80053 COMPREHEN METABOLIC PANEL: CPT

## 2023-09-13 ENCOUNTER — OFFICE VISIT (OUTPATIENT)
Dept: MEDICAL GROUP | Facility: MEDICAL CENTER | Age: 33
End: 2023-09-13
Payer: COMMERCIAL

## 2023-09-13 VITALS
DIASTOLIC BLOOD PRESSURE: 84 MMHG | SYSTOLIC BLOOD PRESSURE: 120 MMHG | HEIGHT: 74 IN | WEIGHT: 240 LBS | BODY MASS INDEX: 30.8 KG/M2 | TEMPERATURE: 97.7 F | OXYGEN SATURATION: 96 % | HEART RATE: 72 BPM

## 2023-09-13 DIAGNOSIS — M79.674 GREAT TOE PAIN, RIGHT: ICD-10-CM

## 2023-09-13 DIAGNOSIS — L60.0 INGROWN TOENAIL OF LEFT FOOT WITH INFECTION: ICD-10-CM

## 2023-09-13 PROCEDURE — 99213 OFFICE O/P EST LOW 20 MIN: CPT | Performed by: STUDENT IN AN ORGANIZED HEALTH CARE EDUCATION/TRAINING PROGRAM

## 2023-09-13 PROCEDURE — 3074F SYST BP LT 130 MM HG: CPT | Performed by: STUDENT IN AN ORGANIZED HEALTH CARE EDUCATION/TRAINING PROGRAM

## 2023-09-13 PROCEDURE — 3079F DIAST BP 80-89 MM HG: CPT | Performed by: STUDENT IN AN ORGANIZED HEALTH CARE EDUCATION/TRAINING PROGRAM

## 2023-09-13 RX ORDER — DOXYCYCLINE HYCLATE 100 MG
100 TABLET ORAL 2 TIMES DAILY
Qty: 14 TABLET | Refills: 0 | Status: SHIPPED | OUTPATIENT
Start: 2023-09-13 | End: 2023-09-20

## 2023-09-13 NOTE — PROGRESS NOTES
"Subjective:     CC: right great toe pain    HPI:   Deangelo presents today for right great toe pain    Problem   Great Toe Pain, Right    Acute condition.    Character: aching pain  Onset: 1 weeks  Location: right great toe  Duration: constant  Exacerbating factors:   Relieving factors:  Associated symptoms:  Severity: persistent     Ingrown Toenail of Left Foot With Infection (Resolved)    Chronic condition.    Character: aching pain  Onset: past week  Location: left great toe  Duration: constant  Exacerbating factors: pressure  Relieving factors: none  Associated symptoms: pus drainage  Severity: persistent         Current Outpatient Medications Ordered in Epic   Medication Sig Dispense Refill    doxycycline (VIBRAMYCIN) 100 MG Tab Take 1 Tablet by mouth 2 times a day for 7 days. 14 Tablet 0    albuterol 108 (90 Base) MCG/ACT Aero Soln inhalation aerosol Inhale 2 Puffs every 6 hours as needed for Shortness of Breath. 8.5 g 0     No current Epic-ordered facility-administered medications on file.     ROS:  See HPI    Objective:     Exam:  /84 (BP Location: Left arm, Patient Position: Sitting, BP Cuff Size: Adult)   Pulse 72   Temp 36.5 °C (97.7 °F) (Temporal)   Ht 1.88 m (6' 2\")   Wt 109 kg (240 lb)   SpO2 96%   BMI 30.81 kg/m²  Body mass index is 30.81 kg/m².    Gen: Alert and oriented, No apparent distress.  Lungs: Normal effort, CTA bilaterally, no wheezes, rhonchi, or rales  CV: Regular rate and rhythm. No murmurs, rubs, or gallops.  Ext: Right great toe with mild erythema and edematous and TTP to lateral border.    Assessment & Plan:     33 y.o. male with the following -     1. Great toe pain, right  Acute condition, improving. Suspect possible ingrown toenail vs hammertoe. Rx doxycyline per order. Advised to trial toe cushions for now. Refer to podiatry for further evaluation and/or management.  - Referral to Podiatry   - doxycycline (VIBRAMYCIN) 100 MG Tab; Take 1 Tablet by mouth 2 times a day for 7 " days.  Dispense: 14 Tablet; Refill: 0    2. Ingrown toenail of left foot with infection  Resolved.    Return if symptoms worsen or fail to improve.    Please note that this dictation was created using voice recognition software. I have made every reasonable attempt to correct obvious errors, but I expect that there are errors of grammar and possibly content that I did not discover before finalizing the note.

## 2023-09-18 NOTE — PROGRESS NOTES
"Subjective:     CC: pressure in throat    HPI:   Deangelo presents today for pressure in throat    Problem   Chronic Gerd    Chronic condition. Symptoms have worsened in the past few days after started taking doxycycline. He has been taking omeprazole at home but with persistent symptoms. He would like to revisit GI specialist and get further evaluation.     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 Symptoms have been well controlled without medications.         Current Outpatient Medications Ordered in Epic   Medication Sig Dispense Refill    pantoprazole (PROTONIX) 40 MG Tablet Delayed Response Take 1 Tablet by mouth every day for 360 days. 90 Tablet 3    doxycycline (VIBRAMYCIN) 100 MG Tab Take 1 Tablet by mouth 2 times a day for 7 days. 14 Tablet 0    albuterol 108 (90 Base) MCG/ACT Aero Soln inhalation aerosol Inhale 2 Puffs every 6 hours as needed for Shortness of Breath. 8.5 g 0     No current Epic-ordered facility-administered medications on file.     SH: works as , moved from [place] to [place] around 07/2021, no regular contact with chemicals, occasional cigar, social EtOH, denies illicit drugs    ROS:  See HPI    Objective:     Exam:  /66 (BP Location: Left arm, Patient Position: Sitting, BP Cuff Size: Adult)   Pulse 71   Temp 36.6 °C (97.9 °F) (Temporal)   Ht 1.88 m (6' 2\")   Wt 110 kg (242 lb)   SpO2 97%   BMI 31.07 kg/m²  Body mass index is 31.07 kg/m².    Gen: Alert and oriented, No apparent distress.  HEENT: Normocephalic. Nasal mucosa benign, oropharynx is without erythema, edema or exudates.  Neck: Neck is supple.  Lungs: Normal effort, CTA bilaterally, no wheezes, rhonchi, or rales  CV: Regular rate and rhythm. No murmurs, rubs, or gallops.    Assessment & Plan:     33 y.o. male with the following -     1. Chronic GERD  Chronic condition, worsened. Follow up testing per orders. Discontinue doxycycline at this time as toe got better. " Restart pantoprazole. Refer to GI for further evaluation and/or management.  - Referral to Gastroenterology  - DX-ESOPHAGUS BARIUM SWALLOW SINGLE CONTRAST; Future  - pantoprazole (PROTONIX) 40 MG Tablet Delayed Response; Take 1 Tablet by mouth every day for 360 days.  Dispense: 90 Tablet; Refill: 3    2. Jackhammer esophagus  - plan as above  - Referral to Gastroenterology  - DX-ESOPHAGUS BARIUM SWALLOW SINGLE CONTRAST; Future  - pantoprazole (PROTONIX) 40 MG Tablet Delayed Response; Take 1 Tablet by mouth every day for 360 days.  Dispense: 90 Tablet; Refill: 3      Return if symptoms worsen or fail to improve.    Please note that this dictation was created using voice recognition software. I have made every reasonable attempt to correct obvious errors, but I expect that there are errors of grammar and possibly content that I did not discover before finalizing the note.

## 2023-09-19 ENCOUNTER — OFFICE VISIT (OUTPATIENT)
Dept: MEDICAL GROUP | Facility: MEDICAL CENTER | Age: 33
End: 2023-09-19
Payer: COMMERCIAL

## 2023-09-19 VITALS
BODY MASS INDEX: 31.06 KG/M2 | HEIGHT: 74 IN | DIASTOLIC BLOOD PRESSURE: 66 MMHG | SYSTOLIC BLOOD PRESSURE: 106 MMHG | HEART RATE: 71 BPM | OXYGEN SATURATION: 97 % | TEMPERATURE: 97.9 F | WEIGHT: 242 LBS

## 2023-09-19 DIAGNOSIS — K22.4 JACKHAMMER ESOPHAGUS: ICD-10-CM

## 2023-09-19 DIAGNOSIS — K21.9 CHRONIC GERD: ICD-10-CM

## 2023-09-19 PROCEDURE — 3078F DIAST BP <80 MM HG: CPT | Performed by: STUDENT IN AN ORGANIZED HEALTH CARE EDUCATION/TRAINING PROGRAM

## 2023-09-19 PROCEDURE — 99214 OFFICE O/P EST MOD 30 MIN: CPT | Performed by: STUDENT IN AN ORGANIZED HEALTH CARE EDUCATION/TRAINING PROGRAM

## 2023-09-19 PROCEDURE — 3074F SYST BP LT 130 MM HG: CPT | Performed by: STUDENT IN AN ORGANIZED HEALTH CARE EDUCATION/TRAINING PROGRAM

## 2023-09-19 RX ORDER — PANTOPRAZOLE SODIUM 40 MG/1
40 TABLET, DELAYED RELEASE ORAL DAILY
Qty: 90 TABLET | Refills: 3 | Status: SHIPPED | OUTPATIENT
Start: 2023-09-19 | End: 2024-09-13

## 2023-09-21 ENCOUNTER — HOSPITAL ENCOUNTER (OUTPATIENT)
Dept: RADIOLOGY | Facility: MEDICAL CENTER | Age: 33
End: 2023-09-21
Attending: STUDENT IN AN ORGANIZED HEALTH CARE EDUCATION/TRAINING PROGRAM
Payer: COMMERCIAL

## 2023-09-21 DIAGNOSIS — K21.9 CHRONIC GERD: ICD-10-CM

## 2023-09-21 DIAGNOSIS — K22.4 JACKHAMMER ESOPHAGUS: ICD-10-CM

## 2023-09-21 PROCEDURE — 74220 X-RAY XM ESOPHAGUS 1CNTRST: CPT

## 2023-09-21 PROCEDURE — 700117 HCHG RX CONTRAST REV CODE 255: Performed by: STUDENT IN AN ORGANIZED HEALTH CARE EDUCATION/TRAINING PROGRAM

## 2023-09-21 RX ADMIN — BARIUM SULFATE 700 MG: 700 TABLET ORAL at 09:10

## 2023-10-27 ENCOUNTER — DOCUMENTATION (OUTPATIENT)
Dept: HEALTH INFORMATION MANAGEMENT | Facility: OTHER | Age: 33
End: 2023-10-27
Payer: COMMERCIAL

## 2024-02-02 ENCOUNTER — TELEPHONE (OUTPATIENT)
Dept: HEALTH INFORMATION MANAGEMENT | Facility: OTHER | Age: 34
End: 2024-02-02
Payer: COMMERCIAL

## 2024-03-04 ENCOUNTER — OFFICE VISIT (OUTPATIENT)
Dept: URGENT CARE | Facility: CLINIC | Age: 34
End: 2024-03-04
Payer: COMMERCIAL

## 2024-03-04 VITALS
SYSTOLIC BLOOD PRESSURE: 124 MMHG | BODY MASS INDEX: 29.13 KG/M2 | WEIGHT: 227 LBS | OXYGEN SATURATION: 97 % | RESPIRATION RATE: 16 BRPM | DIASTOLIC BLOOD PRESSURE: 88 MMHG | HEIGHT: 74 IN | HEART RATE: 62 BPM | TEMPERATURE: 97.7 F

## 2024-03-04 DIAGNOSIS — H69.92 ACUTE DYSFUNCTION OF LEFT EUSTACHIAN TUBE: ICD-10-CM

## 2024-03-04 DIAGNOSIS — E78.5 HYPERLIPIDEMIA, UNSPECIFIED HYPERLIPIDEMIA TYPE: ICD-10-CM

## 2024-03-04 PROCEDURE — 99213 OFFICE O/P EST LOW 20 MIN: CPT | Performed by: FAMILY MEDICINE

## 2024-03-04 PROCEDURE — 3074F SYST BP LT 130 MM HG: CPT | Performed by: FAMILY MEDICINE

## 2024-03-04 PROCEDURE — 3079F DIAST BP 80-89 MM HG: CPT | Performed by: FAMILY MEDICINE

## 2024-03-04 ASSESSMENT — ENCOUNTER SYMPTOMS
EYES NEGATIVE: 1
DIZZINESS: 1
RESPIRATORY NEGATIVE: 1
CARDIOVASCULAR NEGATIVE: 1
CONSTITUTIONAL NEGATIVE: 1

## 2024-03-04 NOTE — PROGRESS NOTES
"Subjective:   Deangelo Tovar is a 33 y.o. male who presents for Dizziness (X2-3 days, \" Pounding in my head. Dizziness is getting progressively worse.\" )      Dizziness  Associated symptoms include congestion.       Review of Systems   Constitutional: Negative.    HENT:  Positive for congestion.    Eyes: Negative.    Respiratory: Negative.     Cardiovascular: Negative.    Neurological:  Positive for dizziness.       Medications, Allergies, and current problem list reviewed today in Epic.     Objective:     /88 (BP Location: Right arm, Patient Position: Sitting, BP Cuff Size: Adult long)   Pulse 62   Temp 36.5 °C (97.7 °F) (Temporal)   Resp 16   Ht 1.88 m (6' 2\")   Wt 103 kg (227 lb)   SpO2 97%     Physical Exam  Vitals and nursing note reviewed.   HENT:      Head: Normocephalic and atraumatic.      Nose: Congestion present.   Eyes:      Extraocular Movements: Extraocular movements intact.      Pupils: Pupils are equal, round, and reactive to light.      Comments: Nystagmus to the left ear   Cardiovascular:      Rate and Rhythm: Normal rate and regular rhythm.      Pulses: Normal pulses.      Heart sounds: Normal heart sounds.   Pulmonary:      Effort: Pulmonary effort is normal.      Breath sounds: Normal breath sounds.   Musculoskeletal:      Cervical back: Normal range of motion and neck supple.         Assessment/Plan:     Diagnosis and associated orders:     1. Hyperlipidemia, unspecified hyperlipidemia type  Comp Metabolic Panel    Lipid Profile      2. Acute dysfunction of left eustachian tube           Comments/MDM:     Afrin nasal spray  sudafed         Differential diagnosis, natural history, supportive care, and indications for immediate follow-up discussed.    Advised the patient to follow-up with the primary care physician for recheck, reevaluation, and consideration of further management.    Please note that this dictation was created using voice recognition software. I have made a " reasonable attempt to correct obvious errors, but I expect that there are errors of grammar and possibly content that I did not discover before finalizing the note.    This note was electronically signed by Karlos Porter M.D.

## 2024-03-05 ENCOUNTER — HOSPITAL ENCOUNTER (OUTPATIENT)
Dept: LAB | Facility: MEDICAL CENTER | Age: 34
End: 2024-03-05
Attending: STUDENT IN AN ORGANIZED HEALTH CARE EDUCATION/TRAINING PROGRAM
Payer: COMMERCIAL

## 2024-03-05 DIAGNOSIS — Z00.00 PREVENTATIVE HEALTH CARE: ICD-10-CM

## 2024-03-05 DIAGNOSIS — E66.9 OBESITY (BMI 30.0-34.9): ICD-10-CM

## 2024-03-05 DIAGNOSIS — R74.01 ELEVATED ALT MEASUREMENT: ICD-10-CM

## 2024-03-05 DIAGNOSIS — E78.2 MIXED HYPERLIPIDEMIA: ICD-10-CM

## 2024-03-05 LAB
ALBUMIN SERPL BCP-MCNC: 4.5 G/DL (ref 3.2–4.9)
ALBUMIN/GLOB SERPL: 1.6 G/DL
ALP SERPL-CCNC: 73 U/L (ref 30–99)
ALT SERPL-CCNC: 57 U/L (ref 2–50)
ANION GAP SERPL CALC-SCNC: 8 MMOL/L (ref 7–16)
AST SERPL-CCNC: 29 U/L (ref 12–45)
BILIRUB SERPL-MCNC: 0.6 MG/DL (ref 0.1–1.5)
BUN SERPL-MCNC: 12 MG/DL (ref 8–22)
CALCIUM ALBUM COR SERPL-MCNC: 8.9 MG/DL (ref 8.5–10.5)
CALCIUM SERPL-MCNC: 9.3 MG/DL (ref 8.4–10.2)
CHLORIDE SERPL-SCNC: 103 MMOL/L (ref 96–112)
CHOLEST SERPL-MCNC: 214 MG/DL (ref 100–199)
CO2 SERPL-SCNC: 27 MMOL/L (ref 20–33)
CREAT SERPL-MCNC: 1.24 MG/DL (ref 0.5–1.4)
FASTING STATUS PATIENT QL REPORTED: NORMAL
GFR SERPLBLD CREATININE-BSD FMLA CKD-EPI: 78 ML/MIN/1.73 M 2
GLOBULIN SER CALC-MCNC: 2.9 G/DL (ref 1.9–3.5)
GLUCOSE SERPL-MCNC: 98 MG/DL (ref 65–99)
HBV SURFACE AB SERPL IA-ACNC: 107 MIU/ML (ref 0–10)
HDLC SERPL-MCNC: 47 MG/DL
LDLC SERPL CALC-MCNC: 124 MG/DL
POTASSIUM SERPL-SCNC: 4.2 MMOL/L (ref 3.6–5.5)
PROT SERPL-MCNC: 7.4 G/DL (ref 6–8.2)
SODIUM SERPL-SCNC: 138 MMOL/L (ref 135–145)
TRIGL SERPL-MCNC: 213 MG/DL (ref 0–149)
TSH SERPL DL<=0.005 MIU/L-ACNC: 2.72 UIU/ML (ref 0.38–5.33)

## 2024-03-05 PROCEDURE — 80053 COMPREHEN METABOLIC PANEL: CPT

## 2024-03-05 PROCEDURE — 86706 HEP B SURFACE ANTIBODY: CPT

## 2024-03-05 PROCEDURE — 84443 ASSAY THYROID STIM HORMONE: CPT

## 2024-03-05 PROCEDURE — 80061 LIPID PANEL: CPT

## 2024-03-05 PROCEDURE — 36415 COLL VENOUS BLD VENIPUNCTURE: CPT

## 2024-03-08 ENCOUNTER — OFFICE VISIT (OUTPATIENT)
Dept: URGENT CARE | Facility: CLINIC | Age: 34
End: 2024-03-08
Payer: COMMERCIAL

## 2024-03-08 VITALS
TEMPERATURE: 98.1 F | HEIGHT: 74 IN | OXYGEN SATURATION: 95 % | BODY MASS INDEX: 29.13 KG/M2 | SYSTOLIC BLOOD PRESSURE: 118 MMHG | WEIGHT: 227 LBS | DIASTOLIC BLOOD PRESSURE: 74 MMHG | RESPIRATION RATE: 16 BRPM | HEART RATE: 68 BPM

## 2024-03-08 DIAGNOSIS — S00.33XA CONTUSION OF NOSE, INITIAL ENCOUNTER: ICD-10-CM

## 2024-03-08 DIAGNOSIS — S09.92XA INJURY OF NOSE, INITIAL ENCOUNTER: ICD-10-CM

## 2024-03-08 PROCEDURE — 3078F DIAST BP <80 MM HG: CPT | Performed by: PHYSICIAN ASSISTANT

## 2024-03-08 PROCEDURE — 3074F SYST BP LT 130 MM HG: CPT | Performed by: PHYSICIAN ASSISTANT

## 2024-03-08 PROCEDURE — 99214 OFFICE O/P EST MOD 30 MIN: CPT | Performed by: PHYSICIAN ASSISTANT

## 2024-03-08 ASSESSMENT — ENCOUNTER SYMPTOMS
VOMITING: 0
COUGH: 0
FEVER: 0
HEADACHES: 1
NAUSEA: 0
EYE REDNESS: 0
EYE DISCHARGE: 0

## 2024-03-09 ENCOUNTER — HOSPITAL ENCOUNTER (OUTPATIENT)
Dept: RADIOLOGY | Facility: MEDICAL CENTER | Age: 34
End: 2024-03-09
Attending: PHYSICIAN ASSISTANT
Payer: COMMERCIAL

## 2024-03-09 DIAGNOSIS — S09.92XA INJURY OF NOSE, INITIAL ENCOUNTER: ICD-10-CM

## 2024-03-09 PROCEDURE — 70486 CT MAXILLOFACIAL W/O DYE: CPT

## 2024-03-09 ASSESSMENT — ENCOUNTER SYMPTOMS
BLURRED VISION: 0
DOUBLE VISION: 0
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
## 2024-03-13 ENCOUNTER — APPOINTMENT (OUTPATIENT)
Dept: RADIOLOGY | Facility: MEDICAL CENTER | Age: 34
End: 2024-03-13
Attending: STUDENT IN AN ORGANIZED HEALTH CARE EDUCATION/TRAINING PROGRAM
Payer: COMMERCIAL

## 2024-03-13 ENCOUNTER — HOSPITAL ENCOUNTER (EMERGENCY)
Facility: MEDICAL CENTER | Age: 34
End: 2024-03-14
Attending: STUDENT IN AN ORGANIZED HEALTH CARE EDUCATION/TRAINING PROGRAM
Payer: COMMERCIAL

## 2024-03-13 DIAGNOSIS — R51.9 NONINTRACTABLE EPISODIC HEADACHE, UNSPECIFIED HEADACHE TYPE: ICD-10-CM

## 2024-03-13 LAB
ALBUMIN SERPL BCP-MCNC: 4.8 G/DL (ref 3.2–4.9)
ALBUMIN/GLOB SERPL: 1.8 G/DL
ALP SERPL-CCNC: 77 U/L (ref 30–99)
ALT SERPL-CCNC: 68 U/L (ref 2–50)
ANION GAP SERPL CALC-SCNC: 12 MMOL/L (ref 7–16)
APTT PPP: 25.6 SEC (ref 24.7–36)
AST SERPL-CCNC: 25 U/L (ref 12–45)
BASOPHILS # BLD AUTO: 1 % (ref 0–1.8)
BASOPHILS # BLD: 0.1 K/UL (ref 0–0.12)
BILIRUB SERPL-MCNC: 0.4 MG/DL (ref 0.1–1.5)
BUN SERPL-MCNC: 15 MG/DL (ref 8–22)
CALCIUM ALBUM COR SERPL-MCNC: 8.8 MG/DL (ref 8.5–10.5)
CALCIUM SERPL-MCNC: 9.4 MG/DL (ref 8.4–10.2)
CHLORIDE SERPL-SCNC: 98 MMOL/L (ref 96–112)
CO2 SERPL-SCNC: 27 MMOL/L (ref 20–33)
CREAT SERPL-MCNC: 1.1 MG/DL (ref 0.5–1.4)
EOSINOPHIL # BLD AUTO: 0 K/UL (ref 0–0.51)
EOSINOPHIL NFR BLD: 0 % (ref 0–6.9)
ERYTHROCYTE [DISTWIDTH] IN BLOOD BY AUTOMATED COUNT: 38.8 FL (ref 35.9–50)
GFR SERPLBLD CREATININE-BSD FMLA CKD-EPI: 90 ML/MIN/1.73 M 2
GLOBULIN SER CALC-MCNC: 2.6 G/DL (ref 1.9–3.5)
GLUCOSE SERPL-MCNC: 98 MG/DL (ref 65–99)
HCT VFR BLD AUTO: 52.3 % (ref 42–52)
HGB BLD-MCNC: 19.4 G/DL (ref 14–18)
INR PPP: 0.96 (ref 0.87–1.13)
LYMPHOCYTES # BLD AUTO: 3.43 K/UL (ref 1–4.8)
LYMPHOCYTES NFR BLD: 33 % (ref 22–41)
MANUAL DIFF BLD: NORMAL
MCH RBC QN AUTO: 32.6 PG (ref 27–33)
MCHC RBC AUTO-ENTMCNC: 37.1 G/DL (ref 32.3–36.5)
MCV RBC AUTO: 87.9 FL (ref 81.4–97.8)
MONOCYTES # BLD AUTO: 0.31 K/UL (ref 0–0.85)
MONOCYTES NFR BLD AUTO: 3 % (ref 0–13.4)
MYELOCYTES NFR BLD MANUAL: 1 %
NEUTROPHILS # BLD AUTO: 6.45 K/UL (ref 1.82–7.42)
NEUTROPHILS NFR BLD: 62 % (ref 44–72)
NRBC # BLD AUTO: 0 K/UL
NRBC BLD-RTO: 0 /100 WBC (ref 0–0.2)
PLATELET # BLD AUTO: 209 K/UL (ref 164–446)
PLATELET BLD QL SMEAR: NORMAL
PMV BLD AUTO: 8.7 FL (ref 9–12.9)
POTASSIUM SERPL-SCNC: 3.7 MMOL/L (ref 3.6–5.5)
PROT SERPL-MCNC: 7.4 G/DL (ref 6–8.2)
PROTHROMBIN TIME: 13.2 SEC (ref 12–14.6)
RBC # BLD AUTO: 5.95 M/UL (ref 4.7–6.1)
RBC BLD AUTO: NORMAL
SODIUM SERPL-SCNC: 137 MMOL/L (ref 135–145)
WBC # BLD AUTO: 10.4 K/UL (ref 4.8–10.8)

## 2024-03-13 PROCEDURE — 36415 COLL VENOUS BLD VENIPUNCTURE: CPT

## 2024-03-13 PROCEDURE — 85007 BL SMEAR W/DIFF WBC COUNT: CPT

## 2024-03-13 PROCEDURE — 80053 COMPREHEN METABOLIC PANEL: CPT

## 2024-03-13 PROCEDURE — 70450 CT HEAD/BRAIN W/O DYE: CPT

## 2024-03-13 PROCEDURE — 99284 EMERGENCY DEPT VISIT MOD MDM: CPT

## 2024-03-13 PROCEDURE — 85027 COMPLETE CBC AUTOMATED: CPT

## 2024-03-13 PROCEDURE — 85610 PROTHROMBIN TIME: CPT

## 2024-03-13 PROCEDURE — 85730 THROMBOPLASTIN TIME PARTIAL: CPT

## 2024-03-14 VITALS
DIASTOLIC BLOOD PRESSURE: 97 MMHG | OXYGEN SATURATION: 96 % | TEMPERATURE: 98 F | RESPIRATION RATE: 16 BRPM | WEIGHT: 234.57 LBS | SYSTOLIC BLOOD PRESSURE: 148 MMHG | HEIGHT: 74 IN | HEART RATE: 75 BPM | BODY MASS INDEX: 30.1 KG/M2

## 2024-03-14 PROCEDURE — 700102 HCHG RX REV CODE 250 W/ 637 OVERRIDE(OP): Performed by: STUDENT IN AN ORGANIZED HEALTH CARE EDUCATION/TRAINING PROGRAM

## 2024-03-14 PROCEDURE — A9270 NON-COVERED ITEM OR SERVICE: HCPCS | Performed by: STUDENT IN AN ORGANIZED HEALTH CARE EDUCATION/TRAINING PROGRAM

## 2024-03-14 PROCEDURE — 36415 COLL VENOUS BLD VENIPUNCTURE: CPT

## 2024-03-14 PROCEDURE — 99284 EMERGENCY DEPT VISIT MOD MDM: CPT

## 2024-03-14 RX ORDER — IBUPROFEN 600 MG/1
600 TABLET ORAL ONCE
Status: COMPLETED | OUTPATIENT
Start: 2024-03-14 | End: 2024-03-14

## 2024-03-14 RX ORDER — ACETAMINOPHEN 325 MG/1
650 TABLET ORAL ONCE
Status: COMPLETED | OUTPATIENT
Start: 2024-03-14 | End: 2024-03-14

## 2024-03-14 RX ADMIN — IBUPROFEN 600 MG: 600 TABLET, FILM COATED ORAL at 00:15

## 2024-03-14 RX ADMIN — ACETAMINOPHEN 650 MG: 325 TABLET ORAL at 00:15
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
## 2024-03-14 NOTE — DISCHARGE INSTRUCTIONS
You were seen in the emergency department for a persistent headache.    CT scan of your head showed no evidence of acute fluid collection, bleed.   Due to concern for possible CSF leak, we discussed her case with neurosurgery.  They recommended that you collect nasal secretions for the next week and follow-up in clinic for an evaluation of an enzyme that is seen in CSF from the nasal collections.    Please follow-up with Dr. Horvath, call the number below to make an appointment for next week, tell them you were seen in the emergency department and that she wants to see you in clinic next week.    [facility]  [id number] [id number]   [phone]

## 2024-04-02 ENCOUNTER — OFFICE VISIT (OUTPATIENT)
Dept: MEDICAL GROUP | Facility: MEDICAL CENTER | Age: 34
End: 2024-04-02
Payer: COMMERCIAL

## 2024-04-02 VITALS
BODY MASS INDEX: 30.29 KG/M2 | SYSTOLIC BLOOD PRESSURE: 144 MMHG | TEMPERATURE: 98.6 F | DIASTOLIC BLOOD PRESSURE: 84 MMHG | OXYGEN SATURATION: 95 % | WEIGHT: 236 LBS | HEIGHT: 74 IN | HEART RATE: 78 BPM

## 2024-04-02 DIAGNOSIS — G96.00 CSF LEAK: ICD-10-CM

## 2024-04-02 DIAGNOSIS — R07.89 OTHER CHEST PAIN: ICD-10-CM

## 2024-04-02 DIAGNOSIS — E78.2 MIXED HYPERLIPIDEMIA: ICD-10-CM

## 2024-04-02 DIAGNOSIS — K21.9 CHRONIC GERD: ICD-10-CM

## 2024-04-02 DIAGNOSIS — R03.0 ELEVATED BP WITHOUT DIAGNOSIS OF HYPERTENSION: ICD-10-CM

## 2024-04-02 DIAGNOSIS — Z00.00 ENCOUNTER FOR MEDICAL EXAMINATION TO ESTABLISH CARE: ICD-10-CM

## 2024-04-02 DIAGNOSIS — K22.4 JACKHAMMER ESOPHAGUS: ICD-10-CM

## 2024-04-02 DIAGNOSIS — Z82.49 FAMILY HISTORY OF HEART DISEASE: ICD-10-CM

## 2024-04-02 PROBLEM — U07.1 COVID-19: Status: RESOLVED | Noted: 2022-01-06 | Resolved: 2024-04-02

## 2024-04-02 PROCEDURE — 3077F SYST BP >= 140 MM HG: CPT | Performed by: STUDENT IN AN ORGANIZED HEALTH CARE EDUCATION/TRAINING PROGRAM

## 2024-04-02 PROCEDURE — 99214 OFFICE O/P EST MOD 30 MIN: CPT | Performed by: STUDENT IN AN ORGANIZED HEALTH CARE EDUCATION/TRAINING PROGRAM

## 2024-04-02 PROCEDURE — 3079F DIAST BP 80-89 MM HG: CPT | Performed by: STUDENT IN AN ORGANIZED HEALTH CARE EDUCATION/TRAINING PROGRAM

## 2024-04-02 ASSESSMENT — FIBROSIS 4 INDEX: FIB4 SCORE: 0.49

## 2024-04-02 ASSESSMENT — PATIENT HEALTH QUESTIONNAIRE - PHQ9: CLINICAL INTERPRETATION OF PHQ2 SCORE: 0

## 2024-04-02 NOTE — PROGRESS NOTES
Subjective:     CC:  Diagnoses of Encounter for medical examination to establish care, Chronic GERD, CSF leak, Jackhammer esophagus, Other chest pain, Family history of heart disease, Mixed hyperlipidemia, and Elevated BP without diagnosis of hypertension were pertinent to this visit.    HISTORY OF THE PRESENT ILLNESS: Patient is a 34 y.o. male. This pleasant patient is here today to establish care and discuss the following    Problem   Csf Leak    This is a new condition.  He got a nasal injury which caused a small CSF leak.  He did see neurosurgery and symptoms do seem consistent with CSF leak, but they are also resolving on their own so no intervention is needed     Elevated Bp Without Diagnosis of Hypertension    This is a new condition.  And elevated blood pressure in the emergency department yesterday but previously it has been WNL.     Other Chest Pain    This is a chronic condition.  He has an aching chest pain as well as left arm ache.  He does not feel this when he exercise, it is intermittent.  Currently asymptomatic.  Does have a family history of heart disease and has hyperlipidemia.     Family History of Heart Disease    Father has high blood pressure and hyperlipidemia. He reports paternal grandfather had heart attack.      Mixed Hyperlipidemia    Chronic condition. Stable with healthy lifestyle management.    Lab Results   Component Value Date/Time    CHOLSTRLTOT 214 (H) 03/05/2024 08:13 AM     (H) 03/05/2024 08:13 AM    HDL 47 03/05/2024 08:13 AM    TRIGLYCERIDE 213 (H) 03/05/2024 08:13 AM       Lab Results   Component Value Date/Time    SODIUM 137 03/13/2024 11:05 PM    POTASSIUM 3.7 03/13/2024 11:05 PM    CHLORIDE 98 03/13/2024 11:05 PM    CO2 27 03/13/2024 11:05 PM    GLUCOSE 98 03/13/2024 11:05 PM    BUN 15 03/13/2024 11:05 PM    CREATININE 1.10 03/13/2024 11:05 PM     Lab Results   Component Value Date/Time    ALKPHOSPHAT 77 03/13/2024 11:05 PM    ASTSGOT 25 03/13/2024 11:05 PM     "ALTSGPT 68 (H) 03/13/2024 11:05 PM    TBILIRUBIN 0.4 03/13/2024 11:05 PM           Chronic Gerd    Chronic condition, takes a PPI as needed.  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  Flexeril works best for symptoms but he prefers to not take a muscle relaxer continuously so is not using anything right now     Covid-19 (Resolved)             ROS:   ROS      Objective:     Exam: BP (!) 144/84   Pulse 78   Temp 37 °C (98.6 °F)   Ht 1.88 m (6' 2\")   Wt 107 kg (236 lb)   SpO2 95%  Body mass index is 30.3 kg/m².    Physical Exam  Vitals reviewed.   Constitutional:       General: He is not in acute distress.     Appearance: He is not toxic-appearing.   HENT:      Head: Normocephalic and atraumatic.      Right Ear: External ear normal.      Left Ear: External ear normal.   Eyes:      General:         Right eye: No discharge.         Left eye: No discharge.      Extraocular Movements: Extraocular movements intact.      Conjunctiva/sclera: Conjunctivae normal.   Cardiovascular:      Rate and Rhythm: Normal rate and regular rhythm.      Heart sounds: Normal heart sounds. No murmur heard.  Pulmonary:      Effort: Pulmonary effort is normal. No respiratory distress.      Breath sounds: Normal breath sounds. No wheezing or rales.   Skin:     General: Skin is warm and dry.   Neurological:      Mental Status: He is alert.   Psychiatric:         Mood and Affect: Mood normal.         Behavior: Behavior normal.         Thought Content: Thought content normal.         Judgment: Judgment normal.           Assessment & Plan:   34 y.o. male with the following -    1. Encounter for medical examination to establish care      2. Chronic GERD  Continue as needed pantoprazole    3. CSF leak  Follows with neurosurgery, no intervention needed at this time    4. Jackhammer esophagus  Well-controlled    5. Other chest pain  Discussed getting a stress test because of his " family history, hyperlipidemia and chest pain.  Asymptomatic today  - NM-CARDIAC TREADMILL ONLY-NO IMAGING; Future    6. Family history of heart disease  - NM-CARDIAC TREADMILL ONLY-NO IMAGING; Future    7. Mixed hyperlipidemia  Stable    8. Elevated BP without diagnosis of hypertension  Recheck at next visit      No follow-ups on file.    Please note that this dictation was created using voice recognition software. I have made every reasonable attempt to correct obvious errors, but I expect that there are errors of grammar and possibly content that I did not discover before finalizing the note.

## 2024-06-12 ENCOUNTER — PATIENT MESSAGE (OUTPATIENT)
Dept: MEDICAL GROUP | Facility: MEDICAL CENTER | Age: 34
End: 2024-06-12

## 2024-06-12 ENCOUNTER — APPOINTMENT (OUTPATIENT)
Dept: TELEHEALTH | Facility: TELEMEDICINE | Age: 34
End: 2024-06-12
Payer: COMMERCIAL

## 2024-06-12 ENCOUNTER — TELEMEDICINE (OUTPATIENT)
Dept: TELEHEALTH | Facility: TELEMEDICINE | Age: 34
End: 2024-06-12
Payer: COMMERCIAL

## 2024-06-12 DIAGNOSIS — Z20.828 EXPOSURE TO INFLUENZA: ICD-10-CM

## 2024-06-12 PROCEDURE — 99213 OFFICE O/P EST LOW 20 MIN: CPT | Mod: 95 | Performed by: PHYSICIAN ASSISTANT

## 2024-06-12 RX ORDER — OSELTAMIVIR PHOSPHATE 75 MG/1
75 CAPSULE ORAL 2 TIMES DAILY
Qty: 10 CAPSULE | Refills: 0 | Status: SHIPPED | OUTPATIENT
Start: 2024-06-12

## 2024-06-12 NOTE — PROGRESS NOTES
Virtual Visit: Established Patient   This visit was conducted via Zoom using secure and encrypted videoconferencing technology.   The patient was in their home in the state of [place].    The patient's identity was confirmed and verbal consent was obtained for this virtual visit.    Subjective:   CC: No chief complaint on file.    Deangelo Tovar is a 34 y.o. male presenting for evaluation and management of:    Son tested positive for influenza A  Started having sxs this AM with HA and myalgias, ST  No fevers/chills  No SOB  Told by sons pediatrician to be seen via telehealth to obtain tamiflu rx if symptomatic as is home caring for  and toddler    ROS       Current medicines (including changes today)  Current Outpatient Medications   Medication Sig Dispense Refill    oseltamivir (TAMIFLU) 75 MG Cap Take 1 Capsule by mouth 2 times a day. 10 Capsule 0     No current facility-administered medications for this visit.       Patient Active Problem List    Diagnosis Date Noted    CSF leak 2024    Elevated BP without diagnosis of hypertension 2024    Great toe pain, right 2023    Anterior neck pain 2023    Left wrist pain 2023    Other chest pain 2023    Family history of heart disease 2023    Scapular dyskinesis 2023    Preventative health care 2023    Lumbar herniated disc 2022    Fatty liver 2022    Elevated ALT measurement 2021    Mixed hyperlipidemia 2021    Obesity (BMI 30.0-34.9) 2021    Chronic GERD 2021    Jackhammer esophagus 2021    Chronic pain of left knee 2021        Objective:   There were no vitals taken for this visit.    Physical Exam:  Constitutional: Alert, no distress, well-groomed.  Skin: No rashes in visible areas.  Eye: Round. Conjunctiva clear, lids normal. No icterus.   ENMT: Lips pink without lesions, good dentition, moist mucous membranes. Phonation normal.  Neck: No masses, no  thyromegaly. Moves freely without pain.  Respiratory: Unlabored respiratory effort, no cough or audible wheeze  Psych: Alert and oriented x3, normal affect and mood.     Assessment and Plan:   The following treatment plan was discussed:     1. Exposure to influenza  - oseltamivir (TAMIFLU) 75 MG Cap; Take 1 Capsule by mouth 2 times a day.  Dispense: 10 Capsule; Refill: 0      Follow-up: No follow-ups on file.  Discussed viral etiology of Influenza.     Patients son flu A positive, patient home caring for  and toddler  Shared medical decision making utilized to determine to start tamiflu without testing  Tamiflu sent to patient's preferred pharmacy if requested by patient and an appropriate 48 to 72-hour timeframe  Patient educated on risks verus benefits of taking this medication  Overall, suspicions for pneumonia are low.  Therefore, antibiotics are not indicated at this time.  Discussed associated complications, including risk of pneumonia and ear infections.     Recommend symptomatic care:    OTC second generation antihistamine daily (cetirizine, desloratadine, fexofenadine, levocetirizine, and loratadine) daily IN COMBINATION WITH:  OTC decongestant (Sudafed - Pseudoephedrine) unless contraindication in place, such as hypertension, CAD, narrow-angle glaucoma. Use with caution if the patient has a history of cardiac dysrhythmias, hyperthyroidism, DM, prostatic hypertrophy, and glaucoma should use with caution.  Intranasal fluticasone (Flonase) daily    Nasal saline rinses 2-3 times a day   May use short term nasal sprays, such as oxymetazoline (Afrin) to help relieve nasal discomfort, congestion, and/or pressure. Decongestant sprays should not be used longer than three consecutive days.   Nasal rinsing with saline nasal spray or salt water (e.g., neti pot) can help relieve nasal dryness.  Breathe Right nasal strips at night for nasal congestion,  Ponaris nasal emmollient for nasal congestion, dryness, and  inflammation (do not use with iodine sensitivity)  Cool mist humidification, chest rubs, warm tea with honey, increased fluid intake to thin secretions  Tylenol or ibuprofen as needed for fever control, body aches, and headaches.    If sore throat is present:   Warm salt water gargles, over-the-counter throat sprays, rest, hydration with frozen (eg, ice or popsicles) or warmed liquids, herbal tea containing licorice root, elm inner bark, marshmallow root, and licorice root aqueous dry extract, Cepacol lozenges, soft diet, honey, vitamin C, zinc lozenges, and elderberry supplements.    If symptoms fail to improve within 72 hours, new symptoms develop, symptoms worsen return to clinic or see PCP for re-evaluation.     Remain home from work, school, and other populated environments until at least 24 hours after you no longer have a fever.     Discussed associated complications, including risk of pneumonia and ear infections. Follow up with primary care provider. Follow up urgently for worsening symptoms, ear pain or drainage, shortness of breath, abdominal pain, or any other concerns. Follow up emergently for trouble breathing, elevated heart rate, chest pain, signs of dehydration, dizziness, weakness, decreased urine output, confusion, persistent vomiting, severe headache, neck stiffness, persistent high grade fever.

## 2024-08-20 ENCOUNTER — TELEMEDICINE (OUTPATIENT)
Dept: TELEHEALTH | Facility: TELEMEDICINE | Age: 34
End: 2024-08-20
Payer: COMMERCIAL

## 2024-08-20 DIAGNOSIS — R09.89 CHEST CONGESTION: ICD-10-CM

## 2024-08-20 DIAGNOSIS — U07.1 COVID-19: ICD-10-CM

## 2024-08-20 RX ORDER — ALBUTEROL SULFATE 90 UG/1
2 AEROSOL, METERED RESPIRATORY (INHALATION) EVERY 6 HOURS PRN
Qty: 8.5 G | Refills: 0 | Status: SHIPPED | OUTPATIENT
Start: 2024-08-20

## 2024-08-20 RX ORDER — METHYLPREDNISOLONE 4 MG
TABLET, DOSE PACK ORAL
Qty: 21 TABLET | Refills: 0 | Status: SHIPPED | OUTPATIENT
Start: 2024-08-20

## 2024-08-20 ASSESSMENT — ENCOUNTER SYMPTOMS
COUGH: 1
FEVER: 0
CHILLS: 0

## 2024-08-20 NOTE — PROGRESS NOTES
Subjective     Deangelo Tovar is a 34 y.o. male who presents with URI (+ COVID. Day #6 of symptoms. Gradually improving.)    This evaluation was conducted via  [employer]  using secure and encrypted videoconferencing technology. The patient was in their home in the [facility].    The patient's identity was confirmed and verbal consent was obtained for this virtual visit.         Deangelo is a 34 y.o. male who presents with COVID-19. Patient is on day #6 of symptoms. States gradually improvement of symptoms but still has lingering cough, congestion and fatigue. Reports chest congestion and feels winded. Reports SOB but states he attributes that to his fatigue. Overall improving but was wondering if paxlovid is an option.         Review of Systems   Constitutional:  Positive for malaise/fatigue. Negative for chills and fever.   HENT:  Positive for congestion.    Respiratory:  Positive for cough.               Objective     There were no vitals taken for this visit.     Physical Exam  Constitutional:       Appearance: Normal appearance.   HENT:      Head: Normocephalic and atraumatic.      Nose: Nose normal.      Mouth/Throat:      Mouth: Mucous membranes are moist.      Pharynx: Oropharynx is clear.   Eyes:      Extraocular Movements: Extraocular movements intact.      Conjunctiva/sclera: Conjunctivae normal.      Pupils: Pupils are equal, round, and reactive to light.   Pulmonary:      Effort: Pulmonary effort is normal. No respiratory distress.      Comments: Speaking in complete sentences without SOB. Normal effort.  Neurological:      General: No focal deficit present.      Mental Status: He is alert and oriented to person, place, and time.                             Assessment & Plan        Assessment & Plan  COVID-19  - Day #6 of symptoms. Gradually improving.  - Patient had requested paxlovid but is out of the 5 day window.  - Reports cough, congestion, fatigue and chest congestion. He reports SOB which he  thinks is from being fatigued. He is well appearing and is speaking in complete sentences over virtual visits. No acute distress or respiratory distress. Advised on significant limitations through telemedicine and advised to go to urgent care or ER if SOB continues or worsens.    Orders:    methylPREDNISolone (MEDROL DOSEPAK) 4 MG Tablet Therapy Pack; Follow schedule on package instructions.    albuterol 108 (90 Base) MCG/ACT Aero Soln inhalation aerosol; Inhale 2 Puffs every 6 hours as needed (shorthness of breath and/or wheezing).    Chest congestion    Orders:    methylPREDNISolone (MEDROL DOSEPAK) 4 MG Tablet Therapy Pack; Follow schedule on package instructions.    albuterol 108 (90 Base) MCG/ACT Aero Soln inhalation aerosol; Inhale 2 Puffs every 6 hours as needed (shorthness of breath and/or wheezing).      Differential diagnoses, supportive care measures and indications for immediate follow-up discussed with patient. Pathogenesis of diagnosis discussed including typical length and natural progression.      Discharge Instructions:  THERE ARE SIGNIFICANT LIMITATIONS TO A VISIT THROUGH TELEMEDICINE. Certainly if not improving in the next 48-72 hours or developing new/concerning symptoms or getting/feeling worse go to a walk-in clinic or ER immediately for in-person evaluation.     Please see your primary care doctor regularly for routine check-ups and notify your primary care doctor about today's visit to make sure you are improving and no additional treatment or on-going work up is needed. If you do not have a primary care doctor and need to schedule one you may call [facility] at [phone] to do this.    Instructed to return to urgent care or nearest emergency department if symptoms fail to improve, for any change in condition, further concerns, or new concerning symptoms.    Patient states understanding and agrees with the plan of care and discharge instructions.

## 2024-09-13 ENCOUNTER — OFFICE VISIT (OUTPATIENT)
Dept: URGENT CARE | Facility: CLINIC | Age: 34
End: 2024-09-13
Payer: COMMERCIAL

## 2024-09-13 VITALS
TEMPERATURE: 98 F | DIASTOLIC BLOOD PRESSURE: 72 MMHG | RESPIRATION RATE: 16 BRPM | HEIGHT: 74 IN | BODY MASS INDEX: 28.23 KG/M2 | SYSTOLIC BLOOD PRESSURE: 118 MMHG | WEIGHT: 220 LBS | OXYGEN SATURATION: 96 % | HEART RATE: 64 BPM

## 2024-09-13 DIAGNOSIS — M79.602 PAIN OF LEFT UPPER EXTREMITY: ICD-10-CM

## 2024-09-13 PROCEDURE — 93000 ELECTROCARDIOGRAM COMPLETE: CPT | Performed by: FAMILY MEDICINE

## 2024-09-13 PROCEDURE — 3078F DIAST BP <80 MM HG: CPT | Performed by: FAMILY MEDICINE

## 2024-09-13 PROCEDURE — 99213 OFFICE O/P EST LOW 20 MIN: CPT | Performed by: FAMILY MEDICINE

## 2024-09-13 PROCEDURE — 3074F SYST BP LT 130 MM HG: CPT | Performed by: FAMILY MEDICINE

## 2024-09-13 ASSESSMENT — FIBROSIS 4 INDEX: FIB4 SCORE: 0.49

## 2024-09-13 NOTE — PROGRESS NOTES
"  Subjective:      34 y.o. male presents to urgent care for left bicep pain that started approximately 1 week ago.  There is no inciting event or trauma at that time.  He did initially also have chest pain, which he attributed to being GERD.  He is now concerned that his pains may be originating from his heart.  Chest pain has completely resolved.  There is no nausea or shortness of breath.  His left bicep pain is intermittent and comes with certain movements, is described as a squeezing sensation, currently rated 4/10.    Chest pain red flags  -History of hypertension, diabetes, peripheral artery disease, hypercholesterolemia: no  -Recent trauma, major surgery, or medical procedures: no  -Recent long periods of immobility: no  -Tobacco product use: no  -Recreational drug use such as cocaine: no  -Similar pain in the past: no  -Prior cardiac diagnostic tests (ECHO, stress test, coronary CTA): no  -Family history of cardiovascular disease: yes. His dad and paternal grandfather have hypertension.     He denies any other questions or concerns at this time.    Current problem list, medication, and past medical/surgical history were reviewed in Epic.    ROS  See HPI     Objective:      /72   Pulse 64   Temp 36.7 °C (98 °F) (Temporal)   Resp 16   Ht 1.88 m (6' 2\")   Wt 99.8 kg (220 lb)   SpO2 96%   BMI 28.25 kg/m²     Physical Exam  Constitutional:       General: He is not in acute distress.     Appearance: He is not diaphoretic.   Cardiovascular:      Rate and Rhythm: Normal rate and regular rhythm.      Heart sounds: Normal heart sounds.   Pulmonary:      Effort: Pulmonary effort is normal. No respiratory distress.      Breath sounds: Normal breath sounds.   Chest:      Comments: No discolorations or deformities noted to inspection of chest.  He is not tender to palpation of chest wall.  Musculoskeletal:      Comments: No Jeromy deformity noted to the left biceps.  Biceps hook intact.  Not tender to " palpation of bicep.   Neurological:      Mental Status: He is alert.   Psychiatric:         Mood and Affect: Affect normal.         Judgment: Judgment normal.       Assessment/Plan:     1. Pain of left upper extremity  EKG showing a heart rate of 51 bpm, regular rhythm.  Early repolarization otherwise no ST changes. Most likely muscular strain/sprain.  He was encouraged to use heat, Tylenol, and ibuprofen as needed for symptomatic relief.  - EKG - Clinic Performed      Instructed to return to Urgent Care or nearest Emergency Department if symptoms fail to improve, for any change in condition, further concerns, or new concerning symptoms. Patient states understanding of the plan of care and discharge instructions.    Maricel Shaikh M.D.

## 2025-01-09 ENCOUNTER — OFFICE VISIT (OUTPATIENT)
Dept: URGENT CARE | Facility: CLINIC | Age: 35
End: 2025-01-09
Payer: COMMERCIAL

## 2025-01-09 VITALS
SYSTOLIC BLOOD PRESSURE: 122 MMHG | HEART RATE: 80 BPM | TEMPERATURE: 97.9 F | RESPIRATION RATE: 18 BRPM | HEIGHT: 74 IN | DIASTOLIC BLOOD PRESSURE: 76 MMHG | WEIGHT: 217 LBS | BODY MASS INDEX: 27.85 KG/M2 | OXYGEN SATURATION: 98 %

## 2025-01-09 DIAGNOSIS — J01.00 ACUTE NON-RECURRENT MAXILLARY SINUSITIS: ICD-10-CM

## 2025-01-09 PROCEDURE — 3078F DIAST BP <80 MM HG: CPT | Performed by: PHYSICIAN ASSISTANT

## 2025-01-09 PROCEDURE — 99213 OFFICE O/P EST LOW 20 MIN: CPT | Performed by: PHYSICIAN ASSISTANT

## 2025-01-09 PROCEDURE — 3074F SYST BP LT 130 MM HG: CPT | Performed by: PHYSICIAN ASSISTANT

## 2025-01-09 ASSESSMENT — FIBROSIS 4 INDEX: FIB4 SCORE: 0.49

## 2025-01-09 NOTE — PROGRESS NOTES
"Subjective:     Verbal consent was acquired by the patient to use [employer] ambient listening note generation during this visit     Deangelo Tovar is a 34 y.o. male who presents for Sinusitis (X 5 days, patient explains has been using OTC sudafed )       History of Present Illness  The patient presents for evaluation of sinus pressure.    He has been experiencing sinus pressure for the past 5 days, which was most severe 3 days ago but has since shown slight improvement. The pressure is localized to the sinuses and extends down the back of his throat. He reports no significant nasal discharge, although he did expel a substantial amount during his first NeilMed nasal rinse, with subsequent rinses yielding less. He also reports a cough, primarily due to a tickling sensation in his throat, but has not experienced any fevers. He has a recent history of sinus infections, particularly since relocating to this area approximately 4 years ago. He reports no ear pain or pressure, but notes that his symptoms seemed to improve following the nasal rinses. He describes his nasal discharge as predominantly gray with a slight yellow tinge.    MEDICATIONS  Current: NeilMed nasal rinse      ROS      Medications:  albuterol Aers    Allergies:             Patient has no known allergies.    Past Social Hx:  Deangelo Tovar  reports that he has never smoked. He has never used smokeless tobacco. He reports current alcohol use. He reports that he does not currently use drugs.           Problem list, medications, and allergies reviewed by myself today in Epic.     Objective:     /76 (BP Location: Left arm, Patient Position: Sitting, BP Cuff Size: Large adult)   Pulse 80   Temp 36.6 °C (97.9 °F)   Resp 18   Ht 1.88 m (6' 2\")   Wt 98.4 kg (217 lb)   SpO2 98%   BMI 27.86 kg/m²     Physical Exam  Vitals and nursing note reviewed.   Constitutional:       General: He is not in acute distress.     Appearance: Normal appearance. " He is well-developed. He is not ill-appearing or toxic-appearing.   HENT:      Head: Normocephalic.      Right Ear: External ear normal. No tenderness. A middle ear effusion is present. No mastoid tenderness. Tympanic membrane is injected. Tympanic membrane is not perforated or bulging. Tympanic membrane has decreased mobility.      Left Ear: External ear normal. No tenderness. A middle ear effusion is present. No mastoid tenderness. Tympanic membrane is injected. Tympanic membrane is not perforated or bulging. Tympanic membrane has decreased mobility.      Nose: Mucosal edema, congestion and rhinorrhea present.      Right Nostril: No foreign body.      Left Nostril: No foreign body.      Right Turbinates: Swollen.      Left Turbinates: Swollen.      Right Sinus: Maxillary sinus tenderness and frontal sinus tenderness present.      Left Sinus: Maxillary sinus tenderness and frontal sinus tenderness present.      Mouth/Throat:      Mouth: Mucous membranes are moist.      Pharynx: Uvula midline. Posterior oropharyngeal erythema present. No pharyngeal swelling, oropharyngeal exudate or uvula swelling.      Tonsils: No tonsillar exudate or tonsillar abscesses.      Comments: Mild pharyngeal edema.  No tonsillar exudate.  Eyes:      Extraocular Movements: Extraocular movements intact.      Pupils: Pupils are equal, round, and reactive to light.   Cardiovascular:      Rate and Rhythm: Normal rate and regular rhythm.      Pulses: Normal pulses.      Heart sounds: Normal heart sounds. No murmur heard.  Pulmonary:      Effort: Pulmonary effort is normal. No tachypnea or respiratory distress.      Breath sounds: Normal breath sounds and air entry. No stridor or decreased air movement. No decreased breath sounds, wheezing, rhonchi or rales.      Comments: Lungs are clear to auscultation bilaterally, no rhonchi rales or wheezes  Chest:      Chest wall: No tenderness.   Musculoskeletal:      Cervical back: Normal range of  motion. No rigidity.   Lymphadenopathy:      Cervical: No cervical adenopathy.   Neurological:      Mental Status: He is alert.   Psychiatric:         Behavior: Behavior is cooperative.                 Assessment/Plan:     Diagnosis and Associated Orders:     1. Acute non-recurrent maxillary sinusitis        Medical Decision Making:    Ashley 34 y.o. presents to clinic with:    Assessment & Plan  1. Viral sinus infection.  Symptoms suggest a viral etiology, with the potential for bacterial superinfection around the 7th to 10th day if there is a change in symptoms. He is advised to continue using NeilMed nasal rinse. Flonase, an intranasal steroid spray, is recommended for its direct action on the sinuses. Sudafed can be used as a decongestant if tolerated. Nasal saline spray is also suggested as a natural decongestant. He is instructed to monitor his symptoms and report any changes or worsening. If symptoms worsen or change, such as the development of thick yellow or green discharge, increased pressure, ear pain, low-grade fever, or difficulty breathing, he should contact via MyChart for a prescription of Augmentin.    -Increase water intake  -May use over the counter Ibuprofen/Tylenol as needed for any fever, body aches or throat pain  -May take long acting antihistamine for seasonal allergy symptoms and post-nasal drip as needed  -Over the counter cough suppressant as directed.  -May use over the counter saline nasal spray for nasal lavage for nasal congestion as needed  -May use over the counter Nasacort/Flonase for nasal congestion as needed   -May use throat lozenges for throat discomfort as needed   -May gargle with salt water up to 4x/day as needed for throat discomfort (1 tsp salt dissolved in 1 cup warm water)  -Monitor for increased sinus pain/pressure with sinus congestion with thick mucus production, sinus headache, cough, shortness of breath, fever- need re-evaluation      I personally reviewed prior  external notes and test results pertinent to today's visit. Patient is clinically stable at today's urgent care visit.  Patient appears nontoxic with no acute distress noted. Appropriate for outpatient care at this time.  Return to clinic or go to ED if symptoms worsen or persist.  Red flag symptoms discussed.  Patient/Parent/Guardian voices understanding.   All side effects of medication discussed including allergic response, GI upset, tendon injury, rash, sedation etc    Please note that this dictation was created using voice recognition software. I have made a reasonable attempt to correct obvious errors, but I expect that there are errors of grammar and possibly content that I did not discover before finalizing the note.    This note was electronically signed by Laquita Dior PA-C

## 2025-01-11 DIAGNOSIS — J01.00 ACUTE NON-RECURRENT MAXILLARY SINUSITIS: ICD-10-CM

## 2025-02-24 ENCOUNTER — OFFICE VISIT (OUTPATIENT)
Dept: URGENT CARE | Facility: CLINIC | Age: 35
End: 2025-02-24
Payer: COMMERCIAL

## 2025-02-24 ENCOUNTER — HOSPITAL ENCOUNTER (OUTPATIENT)
Dept: RADIOLOGY | Facility: MEDICAL CENTER | Age: 35
End: 2025-02-24
Attending: FAMILY MEDICINE
Payer: COMMERCIAL

## 2025-02-24 VITALS
RESPIRATION RATE: 16 BRPM | OXYGEN SATURATION: 97 % | WEIGHT: 210 LBS | HEIGHT: 74 IN | SYSTOLIC BLOOD PRESSURE: 128 MMHG | BODY MASS INDEX: 26.95 KG/M2 | TEMPERATURE: 97.8 F | HEART RATE: 74 BPM | DIASTOLIC BLOOD PRESSURE: 68 MMHG

## 2025-02-24 DIAGNOSIS — R10.84 GENERALIZED ABDOMINAL PAIN: ICD-10-CM

## 2025-02-24 LAB
APPEARANCE UR: CLEAR
BILIRUB UR STRIP-MCNC: NEGATIVE MG/DL
COLOR UR AUTO: YELLOW
GLUCOSE UR STRIP.AUTO-MCNC: NEGATIVE MG/DL
KETONES UR STRIP.AUTO-MCNC: NEGATIVE MG/DL
LEUKOCYTE ESTERASE UR QL STRIP.AUTO: NEGATIVE
NITRITE UR QL STRIP.AUTO: NEGATIVE
PH UR STRIP.AUTO: 5.5 [PH] (ref 5–8)
PROT UR QL STRIP: NEGATIVE MG/DL
RBC UR QL AUTO: NEGATIVE
SP GR UR STRIP.AUTO: 1
UROBILINOGEN UR STRIP-MCNC: 0.2 MG/DL

## 2025-02-24 PROCEDURE — 99213 OFFICE O/P EST LOW 20 MIN: CPT | Performed by: FAMILY MEDICINE

## 2025-02-24 PROCEDURE — 76870 US EXAM SCROTUM: CPT

## 2025-02-24 PROCEDURE — 3074F SYST BP LT 130 MM HG: CPT | Performed by: FAMILY MEDICINE

## 2025-02-24 PROCEDURE — 3078F DIAST BP <80 MM HG: CPT | Performed by: FAMILY MEDICINE

## 2025-02-24 PROCEDURE — 81002 URINALYSIS NONAUTO W/O SCOPE: CPT | Performed by: FAMILY MEDICINE

## 2025-02-24 ASSESSMENT — FIBROSIS 4 INDEX: FIB4 SCORE: 0.49

## 2025-02-24 NOTE — PROGRESS NOTES
"  Subjective:      34 y.o. male presents to urgent care for testicular pain that started on Friday.  He reports trying new sexual things that same day, but does not remember any particular injury.  Pain was initially in his left testicle, but is now bilaterally.  Pain is constant, is described as a dull ache, currently rated 2/10.  No changes to urinary urgency, frequency, dysuria, or hematuria.  He is currently sexually active with one, female partner and they use condoms for contraception.  He denies prior history of STD.    He denies any other questions or concerns at this time.    Current problem list, medication, and past medical/surgical history were reviewed in Epic.    ROS  See HPI     Objective:      /68 (BP Location: Left arm, Patient Position: Sitting, BP Cuff Size: Large adult)   Pulse 74   Temp 36.6 °C (97.8 °F) (Temporal)   Resp 16   Ht 1.88 m (6' 2\")   Wt 95.3 kg (210 lb)   SpO2 97%   BMI 26.96 kg/m²     Physical Exam  Constitutional:       General: He is not in acute distress.     Appearance: He is not diaphoretic.   Cardiovascular:      Rate and Rhythm: Normal rate and regular rhythm.      Heart sounds: Normal heart sounds.   Pulmonary:      Effort: Pulmonary effort is normal. No respiratory distress.      Breath sounds: Normal breath sounds.   Genitourinary:     Testes:         Right: Mass, tenderness or swelling not present.         Left: Mass, tenderness or swelling not present.   Neurological:      Mental Status: He is alert.   Psychiatric:         Mood and Affect: Affect normal.         Judgment: Judgment normal.       Assessment/Plan:     1. Generalized abdominal pain  No sign of infection on urinalysis.  Scrotal ultrasound showing left varicocele, otherwise within normal limits.  He was encouraged to stay well-hydrated.  Follow with PCP.  - POCT Urinalysis  - [id number]      Instructed to return to Urgent Care or nearest Emergency Department if symptoms fail to improve, " for any change in condition, further concerns, or new concerning symptoms. Patient states understanding of the plan of care and discharge instructions.    Maricel Shaikh M.D.

## 2025-02-25 DIAGNOSIS — N50.812 PAIN IN BOTH TESTICLES: ICD-10-CM

## 2025-02-25 DIAGNOSIS — N50.811 PAIN IN BOTH TESTICLES: ICD-10-CM

## 2025-03-06 ENCOUNTER — TELEPHONE (OUTPATIENT)
Dept: MEDICAL GROUP | Facility: MEDICAL CENTER | Age: 35
End: 2025-03-06
Payer: COMMERCIAL

## 2025-03-06 DIAGNOSIS — E78.2 MIXED HYPERLIPIDEMIA: ICD-10-CM

## 2025-03-06 DIAGNOSIS — R74.01 ELEVATED ALT MEASUREMENT: ICD-10-CM

## 2025-03-06 DIAGNOSIS — K76.0 FATTY LIVER: ICD-10-CM

## 2025-03-06 NOTE — TELEPHONE ENCOUNTER
Called patient to inform him that the orders have been placed, patient was very happy with the quick response from provider.

## 2025-03-06 NOTE — TELEPHONE ENCOUNTER
Please let patient know I have placed that order as well as one for a complete metabolic panel as the one he had done recently showed abnormal kidney function    Thank You,  Dr. Moise

## 2025-03-07 ENCOUNTER — HOSPITAL ENCOUNTER (OUTPATIENT)
Facility: MEDICAL CENTER | Age: 35
End: 2025-03-07
Attending: FAMILY MEDICINE
Payer: COMMERCIAL

## 2025-03-07 ENCOUNTER — RESULTS FOLLOW-UP (OUTPATIENT)
Dept: MEDICAL GROUP | Facility: MEDICAL CENTER | Age: 35
End: 2025-03-07
Payer: COMMERCIAL

## 2025-03-07 ENCOUNTER — PATIENT MESSAGE (OUTPATIENT)
Dept: MEDICAL GROUP | Facility: MEDICAL CENTER | Age: 35
End: 2025-03-07
Payer: COMMERCIAL

## 2025-03-07 DIAGNOSIS — K21.9 CHRONIC GERD: ICD-10-CM

## 2025-03-07 DIAGNOSIS — E78.2 MIXED HYPERLIPIDEMIA: ICD-10-CM

## 2025-03-07 DIAGNOSIS — R74.01 ELEVATED ALT MEASUREMENT: ICD-10-CM

## 2025-03-07 DIAGNOSIS — K76.0 FATTY LIVER: ICD-10-CM

## 2025-03-07 DIAGNOSIS — K22.4 JACKHAMMER ESOPHAGUS: ICD-10-CM

## 2025-03-07 LAB
ALBUMIN SERPL BCP-MCNC: 4.7 G/DL (ref 3.2–4.9)
ALBUMIN/GLOB SERPL: 2 G/DL
ALP SERPL-CCNC: 63 U/L (ref 30–99)
ALT SERPL-CCNC: 37 U/L (ref 2–50)
ANION GAP SERPL CALC-SCNC: 8 MMOL/L (ref 7–16)
AST SERPL-CCNC: 29 U/L (ref 12–45)
BILIRUB SERPL-MCNC: 0.6 MG/DL (ref 0.1–1.5)
BUN SERPL-MCNC: 18 MG/DL (ref 8–22)
CALCIUM ALBUM COR SERPL-MCNC: 9 MG/DL (ref 8.5–10.5)
CALCIUM SERPL-MCNC: 9.6 MG/DL (ref 8.4–10.2)
CHLORIDE SERPL-SCNC: 105 MMOL/L (ref 96–112)
CHOLEST SERPL-MCNC: 207 MG/DL (ref 100–199)
CO2 SERPL-SCNC: 27 MMOL/L (ref 20–33)
CREAT SERPL-MCNC: 1.3 MG/DL (ref 0.5–1.4)
GFR SERPLBLD CREATININE-BSD FMLA CKD-EPI: 73 ML/MIN/1.73 M 2
GLOBULIN SER CALC-MCNC: 2.4 G/DL (ref 1.9–3.5)
GLUCOSE SERPL-MCNC: 87 MG/DL (ref 65–99)
HDLC SERPL-MCNC: 53 MG/DL
LDLC SERPL CALC-MCNC: 135 MG/DL
POTASSIUM SERPL-SCNC: 4.8 MMOL/L (ref 3.6–5.5)
PROT SERPL-MCNC: 7.1 G/DL (ref 6–8.2)
SODIUM SERPL-SCNC: 140 MMOL/L (ref 135–145)
TRIGL SERPL-MCNC: 93 MG/DL (ref 0–149)

## 2025-03-07 PROCEDURE — 36415 COLL VENOUS BLD VENIPUNCTURE: CPT

## 2025-03-07 PROCEDURE — 80053 COMPREHEN METABOLIC PANEL: CPT

## 2025-03-07 PROCEDURE — 80061 LIPID PANEL: CPT

## 2025-03-11 ENCOUNTER — OFFICE VISIT (OUTPATIENT)
Dept: MEDICAL GROUP | Facility: MEDICAL CENTER | Age: 35
End: 2025-03-11
Payer: COMMERCIAL

## 2025-03-11 VITALS
OXYGEN SATURATION: 96 % | TEMPERATURE: 97.5 F | SYSTOLIC BLOOD PRESSURE: 112 MMHG | HEART RATE: 67 BPM | HEIGHT: 73 IN | DIASTOLIC BLOOD PRESSURE: 78 MMHG | WEIGHT: 217.8 LBS | BODY MASS INDEX: 28.86 KG/M2

## 2025-03-11 DIAGNOSIS — E78.00 PURE HYPERCHOLESTEROLEMIA: ICD-10-CM

## 2025-03-11 DIAGNOSIS — Z30.09 VASECTOMY EVALUATION: ICD-10-CM

## 2025-03-11 DIAGNOSIS — M25.521 RIGHT ELBOW PAIN: ICD-10-CM

## 2025-03-11 DIAGNOSIS — R00.2 PALPITATIONS: ICD-10-CM

## 2025-03-11 DIAGNOSIS — M54.9 MID BACK PAIN: ICD-10-CM

## 2025-03-11 PROCEDURE — 3078F DIAST BP <80 MM HG: CPT | Performed by: STUDENT IN AN ORGANIZED HEALTH CARE EDUCATION/TRAINING PROGRAM

## 2025-03-11 PROCEDURE — 99214 OFFICE O/P EST MOD 30 MIN: CPT | Performed by: STUDENT IN AN ORGANIZED HEALTH CARE EDUCATION/TRAINING PROGRAM

## 2025-03-11 PROCEDURE — 3074F SYST BP LT 130 MM HG: CPT | Performed by: STUDENT IN AN ORGANIZED HEALTH CARE EDUCATION/TRAINING PROGRAM

## 2025-03-11 RX ORDER — OMEPRAZOLE 20 MG/1
20 TABLET, DELAYED RELEASE ORAL DAILY
COMMUNITY
End: 2025-03-18

## 2025-03-11 ASSESSMENT — FIBROSIS 4 INDEX: FIB4 SCORE: 0.76

## 2025-03-11 ASSESSMENT — PATIENT HEALTH QUESTIONNAIRE - PHQ9: CLINICAL INTERPRETATION OF PHQ2 SCORE: 0

## 2025-03-11 NOTE — PROGRESS NOTES
"Subjective:     CC: Palpitations,neck pain, back pain, arm pain, prostate pain    Verbal consent was acquired by the patient to use [facility] ambient listening note generation during this visit Yes     HPI:   Deangelo presents today to follow-up for emergency department visit.  He was seen in the emergency department after several episodes of palpitations.  He initially thought this was due to caffeine use so cut back on his caffeine use without resolution.  When he went to the emergency department he had a normal EKG and labs that were essentially normal apart from a slightly elevated creatinine.  We did subsequent labs that showed a slightly elevated cholesterol and that his creatinine returned to normal.  He continues to have occasional palpitations.  Hoping for a Zio patch.    He also has had chronic right elbow pain that is preventing him doing pull-ups.  Interested in seeing physical therapy.  He also has back pain that has been on and off.    Since returning from the hospital he feels like he has neck pain bilaterally in the front of the neck near the sternocleidomastoids.    He also has noticed what he thought was testicle pain.  He went to urgent care for this and got ultrasound that was normal.  He has an upcoming appointment with urology for a vasectomy.  He is now thinking that perhaps the pain is deeper, for example in the prostate area.    ROS:  ROS    Objective:     Exam:  /78 (BP Location: Left arm, Patient Position: Sitting, BP Cuff Size: Adult)   Pulse 67   Temp 36.4 °C (97.5 °F) (Temporal)   Ht 1.865 m (6' 1.43\")   Wt 98.8 kg (217 lb 12.8 oz)   SpO2 96%   BMI 28.40 kg/m²  Body mass index is 28.4 kg/m².    Physical Exam  Vitals reviewed.   Constitutional:       General: He is not in acute distress.     Appearance: He is not toxic-appearing.   HENT:      Head: Normocephalic and atraumatic.      Right Ear: External ear normal.      Left Ear: External ear normal.   Eyes:      General:       "   Right eye: No discharge.         Left eye: No discharge.      Extraocular Movements: Extraocular movements intact.      Conjunctiva/sclera: Conjunctivae normal.   Cardiovascular:      Rate and Rhythm: Normal rate and regular rhythm.      Pulses: Normal pulses.      Heart sounds: Normal heart sounds.   Pulmonary:      Effort: Pulmonary effort is normal. No respiratory distress.   Musculoskeletal:      Cervical back: Normal range of motion and neck supple. No rigidity or tenderness.   Lymphadenopathy:      Cervical: No cervical adenopathy.   Skin:     General: Skin is warm and dry.   Neurological:      Mental Status: He is alert.   Psychiatric:         Mood and Affect: Mood normal.         Behavior: Behavior normal.         Thought Content: Thought content normal.         Judgment: Judgment normal.         Assessment & Plan:     34 y.o. male with the following -     1. Right elbow pain  Referral to PT  - Referral to Physical Therapy    2. Mid back pain  Referral to PT  - Referral to Physical Therapy    3. Palpitations  Zio patch ordered to assess palpitations  - [facility] ZIO PATCH MONITOR; Future    4. Pure hypercholesterolemia  Patient has been working on decreasing animal fats in the diet, follow-up in 3 to 6 months  - Lipid Profile; Future    5. Vasectomy evaluation  Referral to urology sent for vasectomy evaluation since this was not sent previously and to discuss prostate issues  - Referral to Urology        No follow-ups on file.    Please note that this dictation was created using voice recognition software. I have made every reasonable attempt to correct obvious errors, but I expect that there are errors of grammar and possibly content that I did not discover before finalizing the note.

## 2025-03-12 ENCOUNTER — PATIENT MESSAGE (OUTPATIENT)
Dept: MEDICAL GROUP | Facility: MEDICAL CENTER | Age: 35
End: 2025-03-12
Payer: COMMERCIAL

## 2025-03-12 DIAGNOSIS — M25.521 RIGHT ELBOW PAIN: ICD-10-CM

## 2025-03-12 DIAGNOSIS — M54.9 MID BACK PAIN: ICD-10-CM

## 2025-03-12 RX ORDER — PANTOPRAZOLE SODIUM 40 MG/1
40 TABLET, DELAYED RELEASE ORAL DAILY
Qty: 90 TABLET | Refills: 3 | Status: SHIPPED | OUTPATIENT
Start: 2025-03-12 | End: 2026-03-07

## 2025-03-12 NOTE — PATIENT COMMUNICATION
Received request via: Patient    Was the patient seen in the last year in this department? Yes    Does the patient have an active prescription (recently filled or refills available) for medication(s) requested? No    [facility]    Does the patient have long-term Plus and need 100-day supply? (This applies to ALL medications) Patient does not have SCP

## 2025-03-18 ENCOUNTER — HOSPITAL ENCOUNTER (OUTPATIENT)
Facility: MEDICAL CENTER | Age: 35
End: 2025-03-18
Attending: STUDENT IN AN ORGANIZED HEALTH CARE EDUCATION/TRAINING PROGRAM
Payer: COMMERCIAL

## 2025-03-18 ENCOUNTER — PATIENT MESSAGE (OUTPATIENT)
Dept: MEDICAL GROUP | Facility: MEDICAL CENTER | Age: 35
End: 2025-03-18

## 2025-03-18 ENCOUNTER — OFFICE VISIT (OUTPATIENT)
Dept: MEDICAL GROUP | Facility: MEDICAL CENTER | Age: 35
End: 2025-03-18
Payer: COMMERCIAL

## 2025-03-18 VITALS
OXYGEN SATURATION: 98 % | WEIGHT: 215.8 LBS | HEART RATE: 59 BPM | BODY MASS INDEX: 28.6 KG/M2 | TEMPERATURE: 96.9 F | DIASTOLIC BLOOD PRESSURE: 78 MMHG | SYSTOLIC BLOOD PRESSURE: 114 MMHG | HEIGHT: 73 IN

## 2025-03-18 DIAGNOSIS — R10.2 PELVIC PAIN: ICD-10-CM

## 2025-03-18 DIAGNOSIS — R00.2 PALPITATIONS: ICD-10-CM

## 2025-03-18 DIAGNOSIS — N50.811 PAIN IN BOTH TESTICLES: ICD-10-CM

## 2025-03-18 DIAGNOSIS — N50.812 PAIN IN BOTH TESTICLES: ICD-10-CM

## 2025-03-18 DIAGNOSIS — J34.89 SINUS PRESSURE: ICD-10-CM

## 2025-03-18 DIAGNOSIS — G44.52 NEW DAILY PERSISTENT HEADACHE: ICD-10-CM

## 2025-03-18 LAB
PSA SERPL-MCNC: 0.9 NG/ML (ref 0–4)
T3 SERPL-MCNC: 136 NG/DL (ref 60–181)
T4 FREE SERPL-MCNC: 1.3 NG/DL (ref 0.93–1.7)
THYROPEROXIDASE AB SERPL-ACNC: 36.2 IU/ML (ref 0–9)
TSH SERPL DL<=0.005 MIU/L-ACNC: 2.44 UIU/ML (ref 0.38–5.33)

## 2025-03-18 PROCEDURE — 86376 MICROSOMAL ANTIBODY EACH: CPT

## 2025-03-18 PROCEDURE — 36415 COLL VENOUS BLD VENIPUNCTURE: CPT

## 2025-03-18 PROCEDURE — 84439 ASSAY OF FREE THYROXINE: CPT

## 2025-03-18 PROCEDURE — 3074F SYST BP LT 130 MM HG: CPT | Performed by: STUDENT IN AN ORGANIZED HEALTH CARE EDUCATION/TRAINING PROGRAM

## 2025-03-18 PROCEDURE — 3078F DIAST BP <80 MM HG: CPT | Performed by: STUDENT IN AN ORGANIZED HEALTH CARE EDUCATION/TRAINING PROGRAM

## 2025-03-18 PROCEDURE — 99214 OFFICE O/P EST MOD 30 MIN: CPT | Performed by: STUDENT IN AN ORGANIZED HEALTH CARE EDUCATION/TRAINING PROGRAM

## 2025-03-18 PROCEDURE — 84443 ASSAY THYROID STIM HORMONE: CPT

## 2025-03-18 PROCEDURE — 84153 ASSAY OF PSA TOTAL: CPT

## 2025-03-18 PROCEDURE — 84480 ASSAY TRIIODOTHYRONINE (T3): CPT

## 2025-03-18 RX ORDER — METHYLPREDNISOLONE 4 MG/1
TABLET ORAL
Qty: 21 TABLET | Refills: 0 | Status: SHIPPED | OUTPATIENT
Start: 2025-03-18

## 2025-03-18 RX ORDER — MAGNESIUM OXIDE 400 MG/1
400 TABLET ORAL DAILY
COMMUNITY

## 2025-03-18 ASSESSMENT — FIBROSIS 4 INDEX: FIB4 SCORE: 0.76

## 2025-03-18 NOTE — Clinical Note
REFERRAL APPROVAL NOTICE         Sent on March 18, 2025                   Deangelo Tovar  [address]  [place] [address]                   Dear Mr. Tovar,    After a careful review of the medical information and benefit coverage, [facility] has processed your referral. See below for additional details.    If applicable, you must be actively enrolled with your insurance for coverage of the authorized service. If you have any questions regarding your coverage, please contact your insurance directly.    REFERRAL INFORMATION   Referral #:  [id number]  Referred-To Provider    Referred-By Provider:  Urology    ALLISON Gutierrez MATTHEW C      [address]  [address]  [place] [address]  [phone] [address]  [place] [id number] [phone]    Referral Start Date:  03/11/2025  Referral End Date:   03/11/2026             SCHEDULING  If you do not already have an appointment, please call [phone] to make an appointment.     MORE INFORMATION  If you do not already have a [employer] account, sign up at: [email or web address]  You can access your medical information, make appointments, see lab results, billing information, and more.  If you have questions regarding this referral, please contact  the [facility] Referrals department at:             [phone]. Monday - Friday 8:00AM - 5:00PM.     Sincerely,    [facility]

## 2025-03-18 NOTE — PROGRESS NOTES
"Subjective:     CC: Palpitations, new onset daily headaches    HPI:   Deangelo presents today with continued palpitations.  These are still every day.  I ordered a Zio patch but this was not approved by insurance yet.  He also has new onset daily headaches.  The headaches are sometimes there in the morning but definitely  throughout the day and are worse in the evening.  He has noticed the pain is mostly behind both eyes and in the temples bilaterally.  He also has had some jaw pain.  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    He has also been feeling agitated and on edge recently.    He does have some dull lower abdominal pain with concern for prostate issues.  He has referral to urology as well as a PSA pending.    ROS:  ROS    Objective:     Exam:  /78 (BP Location: Left arm, Patient Position: Sitting, BP Cuff Size: Adult)   Pulse (!) 59   Temp 36.1 °C (96.9 °F) (Temporal)   Ht 1.865 m (6' 1.43\")   Wt 97.9 kg (215 lb 12.8 oz)   SpO2 98%   BMI 28.14 kg/m²  Body mass index is 28.14 kg/m².    Physical Exam  Vitals reviewed.   Constitutional:       General: He is not in acute distress.     Appearance: He is not toxic-appearing.   HENT:      Head: Normocephalic and atraumatic.      Right Ear: External ear normal.      Left Ear: External ear normal.   Eyes:      General:         Right eye: No discharge.         Left eye: No discharge.      Extraocular Movements: Extraocular movements intact.      Conjunctiva/sclera: Conjunctivae normal.   Pulmonary:      Effort: Pulmonary effort is normal. No respiratory distress.   Skin:     General: Skin is warm and dry.   Neurological:      Mental Status: He is alert.   Psychiatric:         Mood and Affect: Mood normal.         Behavior: Behavior normal.         Thought Content: Thought content normal.         Judgment: Judgment normal.           Assessment & Plan:     34 y.o. male with the following -     1. Palpitations  We discussed getting thyroid testing due to his " palpitations that are continuing as well as his agitation and anxiety recently.  These could all be attributed to thyroid issues  - TSH; Future  - FREE THYROXINE; Future  - TRIIDOTHYRONINE; Future  - THYROID PEROXIDASE  (TPO) AB; Future    2. New daily persistent headache  New persistent daily headaches in the setting of palpitations, CT ordered  - CT-HEAD W/O; Future    3. Sinus pressure  We discussed trying a Medrol Dosepak to see if it helps with sinus pressure  - methylPREDNISolone (MEDROL DOSEPAK) 4 MG Tablet Therapy Pack; As directed on the packaging label.  Dispense: 21 Tablet; Refill: 0    4. Pelvic pain  Follow-up with urology, get PSA as ordered    No follow-ups on file.    Please note that this dictation was created using voice recognition software. I have made every reasonable attempt to correct obvious errors, but I expect that there are errors of grammar and possibly content that I did not discover before finalizing the note.

## 2025-03-18 NOTE — Clinical Note
REFERRAL APPROVAL NOTICE         Sent on March 18, 2025                   Deangelo Tovar  [address] [address]                   Dear Mr. Tovar,    After a careful review of the medical information and benefit coverage, [facility] has processed your referral. See below for additional details.    If applicable, you must be actively enrolled with your insurance for coverage of the authorized service. If you have any questions regarding your coverage, please contact your insurance directly.    REFERRAL INFORMATION   Referral #:  [id number]  Referred-To Department    Referred-By Provider:  Physical Therapy    Marlee Moise M.D.   Phys Therapy [facility]      [address]  [address]  [place] [id number] [phone] [address]  [facility] [id number] [phone]    Referral Start Date:  03/13/2025  Referral End Date:   03/13/2026             SCHEDULING  If you do not already have an appointment, please call [phone] to make an appointment.     MORE INFORMATION  If you do not already have a [employer] account, sign up at: [email or web address]  You can access your medical information, make appointments, see lab results, billing information, and more.  If you have questions regarding this referral, please contact  the [facility] Referrals department at:             [phone]. Monday - Friday 8:00AM - 5:00PM.     Sincerely,    [facility]

## 2025-03-19 ENCOUNTER — HOSPITAL ENCOUNTER (OUTPATIENT)
Facility: MEDICAL CENTER | Age: 35
End: 2025-03-19
Attending: STUDENT IN AN ORGANIZED HEALTH CARE EDUCATION/TRAINING PROGRAM
Payer: COMMERCIAL

## 2025-03-19 ENCOUNTER — RESULTS FOLLOW-UP (OUTPATIENT)
Dept: MEDICAL GROUP | Facility: MEDICAL CENTER | Age: 35
End: 2025-03-19
Payer: COMMERCIAL

## 2025-03-19 ENCOUNTER — PATIENT MESSAGE (OUTPATIENT)
Dept: MEDICAL GROUP | Facility: MEDICAL CENTER | Age: 35
End: 2025-03-19
Payer: COMMERCIAL

## 2025-03-19 DIAGNOSIS — M25.50 ARTHRALGIA, UNSPECIFIED JOINT: ICD-10-CM

## 2025-03-19 DIAGNOSIS — M54.2 NECK PAIN: ICD-10-CM

## 2025-03-19 DIAGNOSIS — G44.52 NEW DAILY PERSISTENT HEADACHE: ICD-10-CM

## 2025-03-19 DIAGNOSIS — R76.8 ANTI-TPO ANTIBODIES PRESENT: ICD-10-CM

## 2025-03-19 DIAGNOSIS — J34.89 SINUS PRESSURE: ICD-10-CM

## 2025-03-19 LAB
CRP SERPL HS-MCNC: 0.36 MG/DL (ref 0–0.75)
ERYTHROCYTE [SEDIMENTATION RATE] IN BLOOD BY WESTERGREN METHOD: 1 MM/HOUR (ref 0–20)

## 2025-03-19 PROCEDURE — 86140 C-REACTIVE PROTEIN: CPT

## 2025-03-19 PROCEDURE — 84155 ASSAY OF PROTEIN SERUM: CPT

## 2025-03-19 PROCEDURE — 84165 PROTEIN E-PHORESIS SERUM: CPT

## 2025-03-19 PROCEDURE — 85652 RBC SED RATE AUTOMATED: CPT

## 2025-03-19 PROCEDURE — 83516 IMMUNOASSAY NONANTIBODY: CPT | Mod: 91

## 2025-03-19 PROCEDURE — 36415 COLL VENOUS BLD VENIPUNCTURE: CPT

## 2025-03-19 PROCEDURE — 86038 ANTINUCLEAR ANTIBODIES: CPT

## 2025-03-20 ENCOUNTER — RESULTS FOLLOW-UP (OUTPATIENT)
Dept: MEDICAL GROUP | Facility: MEDICAL CENTER | Age: 35
End: 2025-03-20
Payer: COMMERCIAL

## 2025-03-20 ENCOUNTER — HOSPITAL ENCOUNTER (OUTPATIENT)
Dept: RADIOLOGY | Facility: MEDICAL CENTER | Age: 35
End: 2025-03-20
Attending: STUDENT IN AN ORGANIZED HEALTH CARE EDUCATION/TRAINING PROGRAM
Payer: COMMERCIAL

## 2025-03-20 DIAGNOSIS — M25.521 RIGHT ELBOW PAIN: ICD-10-CM

## 2025-03-20 DIAGNOSIS — G44.52 NEW DAILY PERSISTENT HEADACHE: ICD-10-CM

## 2025-03-20 DIAGNOSIS — M54.9 MID BACK PAIN: ICD-10-CM

## 2025-03-20 DIAGNOSIS — M54.2 NECK PAIN: ICD-10-CM

## 2025-03-20 PROCEDURE — 70450 CT HEAD/BRAIN W/O DYE: CPT

## 2025-03-20 PROCEDURE — 70490 CT SOFT TISSUE NECK W/O DYE: CPT

## 2025-03-21 ENCOUNTER — RESULTS FOLLOW-UP (OUTPATIENT)
Dept: MEDICAL GROUP | Facility: MEDICAL CENTER | Age: 35
End: 2025-03-21
Payer: COMMERCIAL

## 2025-03-21 LAB
MYELOPEROXIDASE AB SER-ACNC: 0 AU/ML (ref 0–19)
NUCLEAR IGG SER QL IA: NORMAL
PROTEINASE3 AB SER-ACNC: 1 AU/ML (ref 0–19)

## 2025-03-24 LAB
ALBUMIN SERPL ELPH-MCNC: 4.56 G/DL (ref 3.75–5.01)
ALPHA1 GLOB SERPL ELPH-MCNC: 0.32 G/DL (ref 0.19–0.46)
ALPHA2 GLOB SERPL ELPH-MCNC: 0.47 G/DL (ref 0.48–1.05)
B-GLOBULIN SERPL ELPH-MCNC: 0.71 G/DL (ref 0.48–1.1)
GAMMA GLOB SERPL ELPH-MCNC: 0.95 G/DL (ref 0.62–1.51)
INTERPRETATION SERPL IFE-IMP: ABNORMAL
MONOCLON BAND OBS SERPL: ABNORMAL
MONOCLONAL PROTEIN NL11656: ABNORMAL G/DL
PATHOLOGY STUDY: ABNORMAL
PROT SERPL-MCNC: 7 G/DL (ref 6.3–8.2)

## 2025-03-25 ENCOUNTER — APPOINTMENT (OUTPATIENT)
Dept: RADIOLOGY | Facility: MEDICAL CENTER | Age: 35
End: 2025-03-25
Attending: STUDENT IN AN ORGANIZED HEALTH CARE EDUCATION/TRAINING PROGRAM
Payer: COMMERCIAL

## 2025-03-27 ENCOUNTER — NON-PROVIDER VISIT (OUTPATIENT)
Dept: CARDIOLOGY | Facility: MEDICAL CENTER | Age: 35
End: 2025-03-27
Attending: STUDENT IN AN ORGANIZED HEALTH CARE EDUCATION/TRAINING PROGRAM
Payer: COMMERCIAL

## 2025-03-27 ENCOUNTER — TELEPHONE (OUTPATIENT)
Dept: CARDIOLOGY | Facility: MEDICAL CENTER | Age: 35
End: 2025-03-27
Payer: COMMERCIAL

## 2025-03-27 DIAGNOSIS — R00.2 PALPITATIONS: ICD-10-CM

## 2025-03-27 PROCEDURE — 93246 EXT ECG>7D<15D RECORDING: CPT

## 2025-03-27 NOTE — Clinical Note
REFERRAL APPROVAL NOTICE         Sent on March 27, 2025                   Deangelo Tovar  [address] [address] [address]                   Dear Mr. Tovar,    After a careful review of the medical information and benefit coverage, [facility] has processed your referral. See below for additional details.    If applicable, you must be actively enrolled with your insurance for coverage of the authorized service. If you have any questions regarding your coverage, please contact your insurance directly.    REFERRAL INFORMATION   Referral #:  [id number]  Referred-To Department    Referred-By Provider:  Physical Therapy    Marlee Moise M.D.   [facility]      [address]  [address]  [facility] [address]  [phone] [address] [address] [facility] [address]  [phone]    Referral Start Date:  03/24/2025  Referral End Date:   03/24/2026             SCHEDULING  If you do not already have an appointment, please call [phone] to make an appointment.     MORE INFORMATION  If you do not already have a [employer] account, sign up at: [email or web address]  You can access your medical information, make appointments, see lab results, billing information, and more.  If you have questions regarding this referral, please contact  the [facility] Referrals department at:             [phone]. Monday - Friday 8:00AM - 5:00PM.     Sincerely,    [facility]

## 2025-03-27 NOTE — PROGRESS NOTES
Patient enrolled in 14 days. Zio Patch program per Marlee Moise.  In clinic hookup.  >Currently pending EOS.  Monitor serial number: [id number]

## 2025-03-31 ENCOUNTER — OFFICE VISIT (OUTPATIENT)
Dept: SPORTS MEDICINE | Facility: OTHER | Age: 35
End: 2025-03-31
Payer: COMMERCIAL

## 2025-03-31 VITALS
BODY MASS INDEX: 28.6 KG/M2 | RESPIRATION RATE: 16 BRPM | OXYGEN SATURATION: 98 % | HEIGHT: 73 IN | WEIGHT: 215.8 LBS | HEART RATE: 72 BPM | SYSTOLIC BLOOD PRESSURE: 124 MMHG | TEMPERATURE: 98.1 F | DIASTOLIC BLOOD PRESSURE: 80 MMHG

## 2025-03-31 DIAGNOSIS — M54.12 LEFT CERVICAL RADICULOPATHY: ICD-10-CM

## 2025-03-31 ASSESSMENT — ENCOUNTER SYMPTOMS
FEVER: 0
HEADACHES: 1
CHILLS: 0
SHORTNESS OF BREATH: 0
NAUSEA: 0
VOMITING: 0
DIZZINESS: 0

## 2025-03-31 ASSESSMENT — FIBROSIS 4 INDEX: FIB4 SCORE: 0.78

## 2025-03-31 NOTE — PROGRESS NOTES
Chief Complaint   Patient presents with    Neck Pain     Neck pain     Subjective     Referred by Marlee Moise MD  for evaluation of cervical spine pain (right-handed dominant)  Insidious onset March 4, 2025  Pain is predominately along the cervical spine  Initially, he felt like he had a pinched nerve on the right side which then started affecting the LEFT  Pain is sharp  Worse with cervical spine flexion  Improved with rest  POSITIVE radiation into the periscapular region  Pain is 1 out of 10 and can be as severe as 9 out of 10  Seen by PCP  Underwent CT scan of the soft tissue of the neck at [facility]  Has tried meloxicam and topical THC cream  Also dealing with mid back pain, tightness and scapular winging on the LEFT side which has been going on since last year for which she saw sports medicine at [facility] (Dr. Waggoner)    Currently being worked up for palpitations  Also found to have positive TPO antibodies    Desk work/  Activities include weightlifting, golf and snowboarding    Review of Systems   Constitutional:  Negative for chills and fever.   Respiratory:  Negative for shortness of breath.    Cardiovascular:  Negative for chest pain.   Gastrointestinal:  Negative for nausea and vomiting.   Neurological:  Positive for headaches. Negative for dizziness.     PMH:  has a past medical history of Patient denies medical problems.  MEDS:   Current Outpatient Medications:     meloxicam (MOBIC) 7.5 MG Tab, Take 1 Tablet by mouth every day., Disp: 30 Tablet, Rfl: 0    Acetaminophen-Caffeine 500-65 MG Tab, Take 1 Tablet by mouth 3 times a day as needed., Disp: , Rfl:     magnesium oxide (MAG-OX) 400 MG Tab tablet, Take 400 mg by mouth every day., Disp: , Rfl:     methylPREDNISolone (MEDROL DOSEPAK) 4 MG Tablet Therapy Pack, As directed on the packaging label., Disp: 21 Tablet, Rfl: 0    pantoprazole (PROTONIX) 40 MG Tablet Delayed Response, Take 1 Tablet by mouth every day for 360 days., Disp: 90  "Tablet, Rfl: 3  ALLERGIES: No Known Allergies  SURGHX:   Past Surgical History:   Procedure Laterality Date    REPAIR, KNEE, ACL Left 2019    ORIF, KNEE  12/5/2019     SOCHX:  reports that he has never smoked. He has never used smokeless tobacco. He reports current alcohol use. He reports current drug use. Drug: Inhaled.  FH: Family history was reviewed, no pertinent findings to report    Objective   /80 (BP Location: Left arm, Patient Position: Sitting, BP Cuff Size: Adult)   Pulse 72   Temp 36.7 °C (98.1 °F) (Temporal)   Resp 16   Ht 1.865 m (6' 1.43\")   Wt 97.9 kg (215 lb 12.8 oz)   SpO2 98%   BMI 28.14 kg/m²     No acute distress    Cervical spine:  Range of motion INTACT  Spurling's testing POSITIVE on the LEFT with pain down into the LEFT forearm compared to the right   No cervical spine tenderness  Strength testing NORMAL deltoid, bicep, tricep, wrist extension, wrist flexion and finger abduction  DTRs: 2 out of 4 bilaterally for biceps, brachial radialis  Orr's testing NEGATIVE    Shoulder exam:  Range of motion INTACT  Strength testing NORMAL with empty can, internal rotation, external rotation and lift off testing  NO tenderness of the supraspinatus, infraspinatus or biceps tendon  Apprehension testing NORMAL  No periscapular winging noted    1. Left cervical radiculopathy  Referral to Physical Therapy        Insidious onset March 4, 2025  Pain is predominately along the cervical spine  Initially, he felt like he had a pinched nerve on the right side which then started affecting the LEFT    Provided with a spine home exercise program  PT [place] ordered  Recommend [facility]    Consider MR c-spine if not responding to treatment  CT scan does demonstrate some multilevel degenerative changes of the C-spine likely related to his remote history of soccer play/heading a soccer ball    No follow-ups on file.  4 wks        3/20/2025 2:42 PM     HISTORY/REASON FOR EXAM:  neck/jaw pain, acute, " worsening.        TECHNIQUE/EXAM DESCRIPTION AND NUMBER OF VIEWS:  CT soft tissue neck without contrast.     The study was performed on a helical multidetector CT scanner. Contiguous thin section helical images were obtained of the neck from the skull base through the thoracic inlet.     Low dose optimization technique was utilized for this CT exam including automated exposure control and adjustment of the mA and/or kV according to patient size.     COMPARISON: 3/9/2024     FINDINGS:     BRAIN: Visualized portions of the brain are normal in appearance.     TEMPORAL bones: The mastoid air cells and middle ear are clear.     PARANASAL SINUSES: The paranasal sinuses are clear.     CERVICAL spine: There is no significant cervical spine abnormality.     NODES: There is no adenopathy or mass within the neck.     SALIVARY and THYROID gland: The parotid, submandibular, and thyroid gland are normal in appearance.     AIRWAY: The airway appears patent.     MEDIASTINUM: The superior mediastinum is normal in appearance.     LUNGS: The visualized portions of lungs are clear.     Orbits: Globes and orbits are normal in appearance. Intraconal and extraconal fat is preserved.     IMPRESSION:     CT of the neck soft tissues without contrast within normal limits.        Exam Ended: 03/20/25  2:52 PM Last Resulted: 03/20/25  3:01 PM     Interpreted in the office today with the patient    Thank you Marlee Moise MD for allowing me to participate in care of your patient.

## 2025-04-02 NOTE — Clinical Note
REFERRAL APPROVAL NOTICE         Sent on April 1, 2025                   Deangelo Tovar  [address]  [place] [address]                   Dear Mr. Tovar,    After a careful review of the medical information and benefit coverage, [facility] has processed your referral. See below for additional details.    If applicable, you must be actively enrolled with your insurance for coverage of the authorized service. If you have any questions regarding your coverage, please contact your insurance directly.    REFERRAL INFORMATION   Referral #:  [id number]  Referred-To Provider    Referred-By Provider:  Physical Therapy    George Leiva M.D.   [facility]      [address]  [place] [address]  [phone] [address]  [place] [address]  [phone]    Referral Start Date:  03/31/2025  Referral End Date:   03/31/2026             SCHEDULING  If you do not already have an appointment, please call [phone] to make an appointment.     MORE INFORMATION  If you do not already have a [employer] account, sign up at: [email or web address]  You can access your medical information, make appointments, see lab results, billing information, and more.  If you have questions regarding this referral, please contact  the [facility] Referrals department at:             [phone]. Monday - Friday 8:00AM - 5:00PM.     Sincerely,    [facility]

## 2025-04-08 ENCOUNTER — OFFICE VISIT (OUTPATIENT)
Dept: MEDICAL GROUP | Facility: MEDICAL CENTER | Age: 35
End: 2025-04-08
Payer: COMMERCIAL

## 2025-04-08 VITALS
SYSTOLIC BLOOD PRESSURE: 114 MMHG | TEMPERATURE: 97.5 F | HEART RATE: 69 BPM | OXYGEN SATURATION: 94 % | BODY MASS INDEX: 28.24 KG/M2 | DIASTOLIC BLOOD PRESSURE: 88 MMHG | WEIGHT: 220.02 LBS | HEIGHT: 74 IN

## 2025-04-08 DIAGNOSIS — R51.9 FRONTAL HEADACHE: ICD-10-CM

## 2025-04-08 DIAGNOSIS — M54.2 NECK PAIN: ICD-10-CM

## 2025-04-08 DIAGNOSIS — R68.84 JAW PAIN: ICD-10-CM

## 2025-04-08 DIAGNOSIS — R10.32 LEFT INGUINAL PAIN: ICD-10-CM

## 2025-04-08 PROCEDURE — 99214 OFFICE O/P EST MOD 30 MIN: CPT | Performed by: FAMILY MEDICINE

## 2025-04-08 PROCEDURE — 3074F SYST BP LT 130 MM HG: CPT | Performed by: FAMILY MEDICINE

## 2025-04-08 PROCEDURE — 3079F DIAST BP 80-89 MM HG: CPT | Performed by: FAMILY MEDICINE

## 2025-04-08 RX ORDER — CETIRIZINE HYDROCHLORIDE 10 MG/1
10 TABLET ORAL DAILY
COMMUNITY

## 2025-04-08 RX ORDER — CEFDINIR 300 MG/1
300 CAPSULE ORAL 2 TIMES DAILY
Qty: 28 CAPSULE | Refills: 0 | Status: SHIPPED | OUTPATIENT
Start: 2025-04-08 | End: 2025-04-22

## 2025-04-08 ASSESSMENT — ENCOUNTER SYMPTOMS
FEVER: 0
CHILLS: 0
NECK PAIN: 1

## 2025-04-08 ASSESSMENT — FIBROSIS 4 INDEX: FIB4 SCORE: 0.78

## 2025-04-08 NOTE — PROGRESS NOTES
Verbal consent was acquired by the patient to use [employer] ambient listening note generation during this visit.      Deangelo was seen today for follow-up.    Diagnoses and all orders for this visit:    Neck pain  -     US-CAROTID DOPPLER BILAT; Future    Frontal headache  -     CT-LOCALIZATION LIMITED SINUSES; Future  -     cefdinir (OMNICEF) 300 MG Cap; Take 1 Capsule by mouth 2 times a day for 14 days.    Jaw pain    Left inguinal pain  -     US-INGUINAL HERNIA; Future                  Assessment & Plan  1. Headache:  Chronic ongoing condition, unstable  - Etiology could be multifactorial (sinus issues or TMJ disorder)  - Order CT scan of the sinuses to investigate nasal passages  - Conduct ultrasound of carotid arteries to rule out blockages  - Referral to a dentist for evaluation of possible TMJ disorder  - Consider referral to TMJ pain clinic if TMJ-related findings are identified by the dentist  - Prescribe cefdinir 300 mg, to be taken twice daily for 14 days, to address potential infection  - Commence medication regimen post-CT scan    2. Neck pain  Chronic condition, unstable  - Could be attributed to an inflamed tendon or potential TMJ disorder  - Advise consultation with a dentist regarding jaw pain  - Conduct ultrasound of carotid arteries to rule out blockages    3. Left inguinal mass and pain  New condition, unstable  - Known left varicocele could be contributing to the pain.  Check ultrasound inguinal  - Referral to a urologist if hernia is detected for further evaluation and management    4. Palpitations  - Currently wearing a Holter monitor to rule out cardiac issues  - Reports experiencing palpitations and a pulsing heartbeat, sometimes felt in the head  - Review results of Holter monitor once available    Follow-up in 6 weeks for imaging follow-up.          Chief complaint::Diagnoses of Neck pain, Frontal headache, Jaw pain, and Left inguinal pain were pertinent to this visit.      History of  Present Illness  The patient is a 35-year-old male who presents for evaluation of headache symptoms, neck pain, and left inguinal mass and pain.    Headache  - Persistent headaches for approximately one month  - Suspects headaches may be due to a pinched nerve  - Under the care of a sports medicine specialist  - Frontal headaches accompanied by pain in the anterior part of neck, jaw, and temples  - Severe sinus pain initially attributed to a cerebrospinal fluid (CSF) leak from the previous year.  CT head normal.  - Another CT scan performed this year due to persistent symptoms  - No vision problems but occasional headaches behind eyes  - Consultation with an ophthalmologist in 2021 due to difficulty driving at night, attributed to mild astigmatism  - Prescribed glasses in 2021    Neck Pain  - Noticeable pain in the anterior part of neck  - Reports constant sinus pain and intermittent jaw pain  - Does not clench teeth at night  - Scheduled an appointment with a dentist  - Increased stress levels due to ongoing health issues  - Finds relief when lying down and tilting head upwards    Left Inguinal Mass and Pain  - Discomfort in lower abdomen for about the same duration as headaches  - Initially believed related to left testicle  - Unremarkable ultrasound results  - Occasional discomfort in cords or spermatic cord area  - Underwent vasectomy last week, does not believe it is related to current symptoms  - Noticed one side appears more enlarged than the other  - Not evaluated for a hernia    He is currently wearing a Holter monitor to rule out any cardiac issues. In early March 2025, he visited the emergency room due to chest pain and numbness in his left arm. All tests returned normal results. He continues to experience palpitations and a pulsating heartbeat, which he can sometimes feel in his head. He expresses concern about potential blockages in his neck. He describes a sensation akin to postnasal drip and reports  "the presence of mucus. He is currently on meloxicam, which appears to increase his discharge, occasionally tinged with blood.          Review of Systems   Constitutional:  Negative for chills and fever.   HENT:          Frontal sinus pain, nasal pain   Genitourinary:         Left inguinal pain   Musculoskeletal:  Positive for neck pain.        B/l jaw pain          Medications and Allergies:     Current Outpatient Medications   Medication Sig Dispense Refill    cetirizine (ZYRTEC) 10 MG Tab Take 10 mg by mouth every day.      cefdinir (OMNICEF) 300 MG Cap Take 1 Capsule by mouth 2 times a day for 14 days. 28 Capsule 0    meloxicam (MOBIC) 7.5 MG Tab Take 1 Tablet by mouth every day. 30 Tablet 0    magnesium oxide (MAG-OX) 400 MG Tab tablet Take 400 mg by mouth every day.      pantoprazole (PROTONIX) 40 MG Tablet Delayed Response Take 1 Tablet by mouth every day for 360 days. 90 Tablet 3     No current facility-administered medications for this visit.       /88 (BP Location: Left arm, Patient Position: Sitting, BP Cuff Size: Adult)   Pulse 69   Temp 36.4 °C (97.5 °F) (Temporal)   Ht 1.867 m (6' 1.5\")   Wt 99.8 kg (220 lb 0.3 oz)   SpO2 94% , Body mass index is 28.63 kg/m².      Physical Exam  Constitutional:       Appearance: Normal appearance. He is well-developed and well-groomed.   HENT:      Head: Normocephalic and atraumatic.      Right Ear: External ear normal.      Left Ear: External ear normal.      Nose:      Right Sinus: Frontal sinus tenderness present.      Left Sinus: Frontal sinus tenderness present.      Comments: Deviated nasal septum which is deviated towards left side and left nasal passages seems blocked  Eyes:      General:         Right eye: No discharge.         Left eye: No discharge.      Conjunctiva/sclera: Conjunctivae normal.   Neck:      Comments: Tenderness on palpation of both side of neck, no mass palpated.  No cervical superficial and deep lymphadenopathy  Cardiovascular:    "   Rate and Rhythm: Normal rate.   Pulmonary:      Effort: Pulmonary effort is normal. No respiratory distress.   Abdominal:      Comments: Mild mass palpated at left inguinal area, tenderness on palpation at left inguinal area   Musculoskeletal:      Cervical back: Neck supple.   Skin:     Findings: No rash.   Neurological:      Mental Status: He is alert.   Psychiatric:         Mood and Affect: Mood and affect normal.         Behavior: Behavior normal.               Please note that this dictation was created using voice recognition software. I have made every reasonable attempt to correct obvious errors, but I expect that there are errors of grammar and possibly content that I did not discover before finalizing the note.

## 2025-04-09 ENCOUNTER — HOSPITAL ENCOUNTER (OUTPATIENT)
Dept: RADIOLOGY | Facility: MEDICAL CENTER | Age: 35
End: 2025-04-09
Attending: FAMILY MEDICINE
Payer: COMMERCIAL

## 2025-04-09 ENCOUNTER — RESULTS FOLLOW-UP (OUTPATIENT)
Dept: MEDICAL GROUP | Facility: MEDICAL CENTER | Age: 35
End: 2025-04-09

## 2025-04-09 DIAGNOSIS — M54.2 NECK PAIN: ICD-10-CM

## 2025-04-09 DIAGNOSIS — R51.9 FRONTAL HEADACHE: ICD-10-CM

## 2025-04-09 DIAGNOSIS — G96.00 CSF LEAK: ICD-10-CM

## 2025-04-09 PROCEDURE — 93880 EXTRACRANIAL BILAT STUDY: CPT | Mod: 26 | Performed by: INTERNAL MEDICINE

## 2025-04-09 PROCEDURE — 93880 EXTRACRANIAL BILAT STUDY: CPT

## 2025-04-09 PROCEDURE — 70486 CT MAXILLOFACIAL W/O DYE: CPT

## 2025-04-15 ENCOUNTER — TELEPHONE (OUTPATIENT)
Dept: CARDIOLOGY | Facility: MEDICAL CENTER | Age: 35
End: 2025-04-15
Payer: COMMERCIAL

## 2025-04-16 ENCOUNTER — RESULTS FOLLOW-UP (OUTPATIENT)
Dept: MEDICAL GROUP | Facility: MEDICAL CENTER | Age: 35
End: 2025-04-16
Payer: COMMERCIAL

## 2025-04-21 DIAGNOSIS — R51.9 FRONTAL HEADACHE: ICD-10-CM

## 2025-04-21 DIAGNOSIS — G96.00 CSF LEAK: ICD-10-CM

## 2025-04-29 DIAGNOSIS — M54.2 NECK PAIN: ICD-10-CM

## 2025-04-29 RX ORDER — MELOXICAM 7.5 MG/1
7.5 TABLET ORAL
Qty: 60 TABLET | Refills: 1 | Status: SHIPPED | OUTPATIENT
Start: 2025-04-29

## 2025-04-29 NOTE — Clinical Note
REFERRAL APPROVAL NOTICE         Sent on April 29, 2025                   Deangelo Christiano Guy  [address] [place] [address]                   Dear Mr. Tovar,    After a careful review of the medical information and benefit coverage, [facility] has processed your referral. See below for additional details.    If applicable, you must be actively enrolled with your insurance for coverage of the authorized service. If you have any questions regarding your coverage, please contact your insurance directly.    REFERRAL INFORMATION   Referral #:  [id number]  Referred-To Provider    Referred-By Provider:  Otolaryngology    ALLISON Holland PAUL D      [address]  [address]  [place] [address]  [phone] [address]  [place] [address]  [phone]    Referral Start Date:  04/21/2025  Referral End Date:   04/21/2026             SCHEDULING  If you do not already have an appointment, please call [phone] to make an appointment.     MORE INFORMATION  If you do not already have a [employer] account, sign up at: [email or web address]  You can access your medical information, make appointments, see lab results, billing information, and more.  If you have questions regarding this referral, please contact  the [facility] Referrals department at:             [phone]. Monday - Friday 8:00AM - 5:00PM.     Sincerely,    [facility]

## 2025-05-02 ENCOUNTER — APPOINTMENT (OUTPATIENT)
Dept: RADIOLOGY | Facility: MEDICAL CENTER | Age: 35
End: 2025-05-02
Attending: FAMILY MEDICINE
Payer: COMMERCIAL

## 2025-05-06 ENCOUNTER — APPOINTMENT (OUTPATIENT)
Dept: URBAN - METROPOLITAN AREA CLINIC 6 | Facility: CLINIC | Age: 35
Setting detail: DERMATOLOGY
End: 2025-05-06

## 2025-05-06 DIAGNOSIS — L28.1 PRURIGO NODULARIS: ICD-10-CM

## 2025-05-06 DIAGNOSIS — D18.0 HEMANGIOMA: ICD-10-CM

## 2025-05-06 DIAGNOSIS — L81.4 OTHER MELANIN HYPERPIGMENTATION: ICD-10-CM

## 2025-05-06 DIAGNOSIS — L73.8 OTHER SPECIFIED FOLLICULAR DISORDERS: ICD-10-CM

## 2025-05-06 DIAGNOSIS — Z71.89 OTHER SPECIFIED COUNSELING: ICD-10-CM

## 2025-05-06 DIAGNOSIS — D22 MELANOCYTIC NEVI: ICD-10-CM

## 2025-05-06 DIAGNOSIS — L82.1 OTHER SEBORRHEIC KERATOSIS: ICD-10-CM

## 2025-05-06 PROBLEM — L30.9 DERMATITIS, UNSPECIFIED: Status: ACTIVE | Noted: 2025-05-06

## 2025-05-06 PROBLEM — D22.5 MELANOCYTIC NEVI OF TRUNK: Status: ACTIVE | Noted: 2025-05-06

## 2025-05-06 PROBLEM — D18.01 HEMANGIOMA OF SKIN AND SUBCUTANEOUS TISSUE: Status: ACTIVE | Noted: 2025-05-06

## 2025-05-06 PROCEDURE — ? SUNSCREEN RECOMMENDATIONS

## 2025-05-06 PROCEDURE — ? COUNSELING

## 2025-05-06 PROCEDURE — 99203 OFFICE O/P NEW LOW 30 MIN: CPT

## 2025-05-06 PROCEDURE — ? ADDITIONAL NOTES

## 2025-05-06 ASSESSMENT — LOCATION DETAILED DESCRIPTION DERM
LOCATION DETAILED: RIGHT INFERIOR UPPER BACK
LOCATION DETAILED: LEFT MEDIAL UPPER BACK
LOCATION DETAILED: LEFT INFERIOR UPPER BACK
LOCATION DETAILED: LEFT CENTRAL MALAR CHEEK
LOCATION DETAILED: LEFT DORSAL GREAT TOE
LOCATION DETAILED: RIGHT SUPERIOR MEDIAL MIDBACK

## 2025-05-06 ASSESSMENT — LOCATION SIMPLE DESCRIPTION DERM
LOCATION SIMPLE: LEFT UPPER BACK
LOCATION SIMPLE: LEFT GREAT TOE
LOCATION SIMPLE: RIGHT LOWER BACK
LOCATION SIMPLE: RIGHT UPPER BACK
LOCATION SIMPLE: LEFT CHEEK

## 2025-05-06 ASSESSMENT — LOCATION ZONE DERM
LOCATION ZONE: TOE
LOCATION ZONE: FACE
LOCATION ZONE: TRUNK

## 2025-05-06 NOTE — PROCEDURE: SUNSCREEN RECOMMENDATIONS

## 2025-05-06 NOTE — PROCEDURE: ADDITIONAL NOTES
Additional Notes: Treated with cryotherapy as a courtesy and to ensure resolution
Render Risk Assessment In Note?: no
Detail Level: Detailed
Additional Notes: Few lesions treated with electrodessication as a courtesy.

## 2025-05-09 ENCOUNTER — OFFICE VISIT (OUTPATIENT)
Dept: NEUROLOGY | Facility: MEDICAL CENTER | Age: 35
End: 2025-05-09
Attending: PSYCHIATRY & NEUROLOGY
Payer: COMMERCIAL

## 2025-05-09 VITALS
DIASTOLIC BLOOD PRESSURE: 74 MMHG | SYSTOLIC BLOOD PRESSURE: 106 MMHG | RESPIRATION RATE: 16 BRPM | BODY MASS INDEX: 28.77 KG/M2 | HEART RATE: 62 BPM | HEIGHT: 74 IN | OXYGEN SATURATION: 98 % | WEIGHT: 224.21 LBS

## 2025-05-09 DIAGNOSIS — G96.00 CSF LEAK: ICD-10-CM

## 2025-05-09 DIAGNOSIS — M54.2 ANTERIOR NECK PAIN: ICD-10-CM

## 2025-05-09 PROCEDURE — 3074F SYST BP LT 130 MM HG: CPT | Performed by: PSYCHIATRY & NEUROLOGY

## 2025-05-09 PROCEDURE — 99205 OFFICE O/P NEW HI 60 MIN: CPT | Performed by: PSYCHIATRY & NEUROLOGY

## 2025-05-09 PROCEDURE — 3078F DIAST BP <80 MM HG: CPT | Performed by: PSYCHIATRY & NEUROLOGY

## 2025-05-09 PROCEDURE — 99213 OFFICE O/P EST LOW 20 MIN: CPT | Performed by: PSYCHIATRY & NEUROLOGY

## 2025-05-09 ASSESSMENT — ENCOUNTER SYMPTOMS
MEMORY LOSS: 0
DIZZINESS: 0
HEADACHES: 1
LOSS OF CONSCIOUSNESS: 0

## 2025-05-09 ASSESSMENT — FIBROSIS 4 INDEX: FIB4 SCORE: 0.78

## 2025-05-09 ASSESSMENT — PATIENT HEALTH QUESTIONNAIRE - PHQ9
CLINICAL INTERPRETATION OF PHQ2 SCORE: 1
5. POOR APPETITE OR OVEREATING: 1 - SEVERAL DAYS
SUM OF ALL RESPONSES TO PHQ QUESTIONS 1-9: 2

## 2025-05-10 NOTE — ASSESSMENT & PLAN NOTE
The separate issue, I think it became activated secondarily because of the headaches that had occurred with his trauma.  The isolated event with radiation of symptoms into the left upper extremity is more likely structurally related, cardiac cause was ruled out at the time.  Imaging of the cervical spine is already being reviewed, hopefully they will not find significant stenosis that might warrant surgical intervention.  Time will tell in this regard.  Clinically he is not overtly myelopathic.  Symptomatically I recommended increasing Mobic to a 15 mg daily dose, and if ineffective after 1 week, Motrin 800 mg 3 times daily or Aleve 660 mg twice daily.    He will be following up with Dr. Haney in the near future regarding the cervical and thoracic spine scans.  There is no need for follow-up with me at this time.    Time: 60 minutes in total spent on patient care including pre-charting, record review, discussion with healthcare staff and documentation.  This includes face-to-face time for exam, review, discussion, as well as counseling and coordinating care.
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
## 2025-05-10 NOTE — PROGRESS NOTES
"Subjective     Deangelo Tovar is a 35 y.o. male who presents with Establish Care and Migraine            HPI    ROS           Objective     /74 (BP Location: Left arm, Patient Position: Sitting, BP Cuff Size: Adult)   Pulse 62   Resp 16   Ht 1.88 m (6' 2\")   Wt 102 kg (224 lb 3.3 oz)   SpO2 98%   BMI 28.79 kg/m²      Physical Exam          Neurological Exam                  Assessment & Plan                 "

## 2025-05-22 ENCOUNTER — OFFICE VISIT (OUTPATIENT)
Dept: MEDICAL GROUP | Facility: MEDICAL CENTER | Age: 35
End: 2025-05-22
Payer: COMMERCIAL

## 2025-05-22 VITALS
HEIGHT: 74 IN | HEART RATE: 69 BPM | OXYGEN SATURATION: 94 % | BODY MASS INDEX: 28.63 KG/M2 | TEMPERATURE: 98.1 F | WEIGHT: 223.11 LBS | DIASTOLIC BLOOD PRESSURE: 82 MMHG | SYSTOLIC BLOOD PRESSURE: 116 MMHG

## 2025-05-22 DIAGNOSIS — J32.4 CHRONIC PANSINUSITIS: Primary | ICD-10-CM

## 2025-05-22 DIAGNOSIS — L98.9 SKIN LESION: ICD-10-CM

## 2025-05-22 DIAGNOSIS — M48.02 CERVICAL STENOSIS OF SPINAL CANAL: ICD-10-CM

## 2025-05-22 PROBLEM — J34.2 DEVIATED NASAL SEPTUM: Status: ACTIVE | Noted: 2025-05-19

## 2025-05-22 PROCEDURE — 99214 OFFICE O/P EST MOD 30 MIN: CPT | Performed by: FAMILY MEDICINE

## 2025-05-22 PROCEDURE — 3079F DIAST BP 80-89 MM HG: CPT | Performed by: FAMILY MEDICINE

## 2025-05-22 PROCEDURE — 3074F SYST BP LT 130 MM HG: CPT | Performed by: FAMILY MEDICINE

## 2025-05-22 RX ORDER — MOMETASONE FUROATE MONOHYDRATE 50 UG/1
2 SPRAY, METERED NASAL DAILY
COMMUNITY
Start: 2025-05-19

## 2025-05-22 ASSESSMENT — ENCOUNTER SYMPTOMS
CHILLS: 0
HEADACHES: 1
NECK PAIN: 1
FEVER: 0

## 2025-05-22 ASSESSMENT — FIBROSIS 4 INDEX: FIB4 SCORE: 0.78

## 2025-05-22 NOTE — PROGRESS NOTES
Verbal consent was acquired by the patient to use [employer] listening note generation during this visit.      Deangelo was seen today for follow-up.    Diagnoses and all orders for this visit:    Chronic pansinusitis    Cervical stenosis of spinal canal    Skin lesion                  Assessment & Plan  1. Sinusitis  Chronic condition, improving  - CT scan of the sinuses did not reveal any abnormalities  - Ultrasound Doppler results were within normal limits  - Patient was seen by ENT and had scope done which showed inflammation in his sinuses and deviated nasal septum  - Patient has been using mometasone nasal spray for about 3 days, which has provided some relief  - Discussed potential need for surgical intervention if nasal spray does not provide sufficient relief    2. Neck pain due to cervical canal stenosis and disc protrusion  New condition, unstable  - Neck x-ray was unremarkable, but subsequent MRI indicated prominent left disc protrusion with annular tear at C5-C6 and C6-C7 resulting in moderate Central canal stenosis and left lateral recess stenosis.  - Disc protrusion may be the source of neck pain  - Undergoing physical therapy for neck and headaches, resulting in some improvement  - Advised continued physical therapy and monitoring of symptoms    3. Skin lesion of left big toe  New condition, unstable  - Patient reported recurrent inflammation and fluid accumulation in the toe  - Dermatologist previously attempted to freeze the lesion without definitive diagnosis  - Advised to consult with dermatologist for a biopsy of the toe lesion due to its recurrent nature and lack of healing  - Discussed importance of identifying the lesion to determine appropriate treatment    Follow-up as needed      Chief complaint::The primary encounter diagnosis was Chronic pansinusitis. Diagnoses of Cervical stenosis of spinal canal and Skin lesion were also pertinent to this visit.      History of Present  "Illness  The patient presents for a follow-up on imaging.    Sinus Condition  - Three imaging studies were ordered, and an antibiotic regimen was initiated for his sinus condition.  - The antibiotic treatment did not yield any improvement.  - A CT scan of the sinuses was normal.  - An ultrasound Doppler was normal.  - An MRI revealed some disk protrusion, which may be causing his neck pain.  - Consulted with an ENT specialist, who identified significant inflammation and a deviated septum.  - Prescribed mometasone nasal spray, which he has been using for the past three days.  - The nasal spray appears to be providing some relief, although not substantial.  - The ENT specialist suggested the possibility of surgical intervention if the nasal spray proves ineffective over time.    Neck Pain and Headaches  - Undergoing physical therapy for his neck pain and headaches.  - Physical therapy has resulted in some improvement.    Toe Condition  - Consulted a dermatologist regarding his toe condition.  - The dermatologist suspected it might be a synovial cyst, characterized by joint fluid leakage from the toe.  - The dermatologist performed cryotherapy on the affected area.  - He has documented the inflammation through photographs.  - The condition recurs every three to four days, causing significant pain until the fluid is released.  - Considering a follow-up consultation with the dermatologist.        Review of Systems   Constitutional:  Negative for chills and fever.   Musculoskeletal:  Positive for neck pain.   Neurological:  Positive for headaches.          Medications and Allergies:     Current Medications[1]    /82 (BP Location: Left arm, Patient Position: Sitting, BP Cuff Size: Adult)   Pulse 69   Temp 36.7 °C (98.1 °F) (Temporal)   Ht 1.867 m (6' 1.5\")   Wt 101 kg (223 lb 1.7 oz)   SpO2 94% , Body mass index is 29.04 kg/m².      Physical Exam  Constitutional:       Appearance: Normal appearance. He is " well-developed and well-groomed.   HENT:      Head: Normocephalic and atraumatic.      Right Ear: External ear normal.      Left Ear: External ear normal.   Eyes:      General:         Right eye: No discharge.         Left eye: No discharge.      Conjunctiva/sclera: Conjunctivae normal.   Cardiovascular:      Rate and Rhythm: Normal rate.   Pulmonary:      Effort: Pulmonary effort is normal. No respiratory distress.   Musculoskeletal:      Cervical back: Neck supple.   Skin:     Findings: No rash.   Neurological:      Mental Status: He is alert.   Psychiatric:         Mood and Affect: Mood and affect normal.         Behavior: Behavior normal.            DX-THORACIC SPINE-2 VIEWS  2 views of the thoracic spine AP lateral demonstrate well-maintained disc   height no evidence of fracture.  DX-CERVICAL SPINE-4+ VIEWS  4 views of the cervical spine AP lateral flexion-extension demonstrate   well-maintained disc height no evidence of fracture.           Please note that this dictation was created using voice recognition software. I have made every reasonable attempt to correct obvious errors, but I expect that there are errors of grammar and possibly content that I did not discover before finalizing the note.           [1]   Current Outpatient Medications   Medication Sig Dispense Refill    mometasone (NASONEX) 50 MCG/ACT nasal spray Administer 2 Sprays into affected nostril(S) every day.      meloxicam (MOBIC) 7.5 MG Tab Take 1 Tablet by mouth 1 time a day as needed for Moderate Pain, Severe Pain or Inflammation. 60 Tablet 1    pantoprazole (PROTONIX) 40 MG Tablet Delayed Response Take 1 Tablet by mouth every day for 360 days. 90 Tablet 3     No current facility-administered medications for this visit.

## 2025-07-08 ENCOUNTER — OFFICE VISIT (OUTPATIENT)
Dept: MEDICAL GROUP | Facility: MEDICAL CENTER | Age: 35
End: 2025-07-08
Payer: COMMERCIAL

## 2025-07-08 VITALS
HEIGHT: 74 IN | WEIGHT: 216.05 LBS | HEART RATE: 81 BPM | DIASTOLIC BLOOD PRESSURE: 96 MMHG | BODY MASS INDEX: 27.73 KG/M2 | SYSTOLIC BLOOD PRESSURE: 124 MMHG | TEMPERATURE: 99 F | OXYGEN SATURATION: 97 %

## 2025-07-08 DIAGNOSIS — R03.0 ELEVATED BP WITHOUT DIAGNOSIS OF HYPERTENSION: ICD-10-CM

## 2025-07-08 DIAGNOSIS — F41.9 ANXIETY AND DEPRESSION: Primary | ICD-10-CM

## 2025-07-08 DIAGNOSIS — F32.A ANXIETY AND DEPRESSION: Primary | ICD-10-CM

## 2025-07-08 PROCEDURE — 3074F SYST BP LT 130 MM HG: CPT | Performed by: FAMILY MEDICINE

## 2025-07-08 PROCEDURE — 99214 OFFICE O/P EST MOD 30 MIN: CPT | Performed by: FAMILY MEDICINE

## 2025-07-08 PROCEDURE — 3080F DIAST BP >= 90 MM HG: CPT | Performed by: FAMILY MEDICINE

## 2025-07-08 RX ORDER — CITALOPRAM HYDROBROMIDE 10 MG/1
10 TABLET ORAL DAILY
Qty: 30 TABLET | Refills: 1 | Status: SHIPPED | OUTPATIENT
Start: 2025-07-08

## 2025-07-08 ASSESSMENT — ENCOUNTER SYMPTOMS
FEVER: 0
CHILLS: 0
INSOMNIA: 0
DEPRESSION: 1
HALLUCINATIONS: 0
NERVOUS/ANXIOUS: 1

## 2025-07-08 ASSESSMENT — FIBROSIS 4 INDEX: FIB4 SCORE: 0.78

## 2025-07-08 ASSESSMENT — LIFESTYLE VARIABLES: SUBSTANCE_ABUSE: 0

## 2025-07-08 NOTE — PROGRESS NOTES
Verbal consent was acquired by the patient to use [employer] ambient listening note generation during this visit.      Deangelo was seen today for follow-up.    Diagnoses and all orders for this visit:    Anxiety and depression  -     Referral to Behavioral Health  -     citalopram (CELEXA) 10 MG tablet; Take 1 Tablet by mouth every day.    Elevated BP without diagnosis of hypertension                  Assessment & Plan  1. Anxiety and depression  New condition, unstable  - Symptoms likely due to job-related stress, including panic attacks, feelings of paralysis, and pervasive worry  - No prior history of these symptoms and no current thoughts of self-harm  - Blood pressure slightly elevated today, possibly due to anxiety  - Referral to a therapist for further evaluation and management  - Advised to engage in meditation and journaling  - Prescription for citalopram 10 mg, with 30 tablets and one refill  - Potential side effects discussed: nausea, vomiting, gastrointestinal upset, weight gain, and sexual dysfunction  - Advised to discontinue medication if severe side effects occur  - Advised to monitor blood pressure at home  - Meloxicam discontinued due to potential interaction with citalopram  - Tylenol recommended for pain relief    2.  Elevated blood pressure  Chronic condition, borderline blood pressure, recommended to monitor blood pressure at home.  We will recheck his blood pressure in office today    Follow-up  - Follow-up appointment scheduled for 4 weeks from now for medication follow-up          Chief complaint::The primary encounter diagnosis was Anxiety and depression. A diagnosis of Elevated BP without diagnosis of hypertension was also pertinent to this visit.      History of Present Illness  The patient is a 35-year-old male who presents to discuss anxiety and depression.    Anxiety and Depression  - He has been experiencing symptoms of anxiety and depression for the past week, which he attributes to  "the stress of his new job promotion.  - He reports feeling overwhelmed by the responsibility of ensuring his team is productive, even when there is a lack of work.  - This has led to feelings of paralysis and constant worry.  - He anticipates an improvement in his condition once the workload increases.  - He does not report any thoughts of self-harm but admits to having fleeting thoughts that he quickly dismisses.  - He describes his life as generally happy, with a supportive wife and two children.    Supplemental information: He is not experiencing any chest pain, palpitations, or shortness of breath. He does not monitor his blood pressure at home. He has not taken meloxicam recently.    FAMILY HISTORY  The patient reports a family history of depression and anxiety.      Review of Systems   Constitutional:  Negative for chills and fever.   Psychiatric/Behavioral:  Positive for depression. Negative for hallucinations, substance abuse and suicidal ideas. The patient is nervous/anxious. The patient does not have insomnia.           Medications and Allergies:       Current Outpatient Medications:     citalopram, 10 mg, Oral, DAILY, Taking    pantoprazole, 40 mg, Oral, DAILY, Taking     BP (!) 124/96   Pulse 81   Temp 37.2 °C (99 °F) (Temporal)   Ht 1.873 m (6' 1.75\")   Wt 98 kg (216 lb 0.8 oz)   SpO2 97% , Body mass index is 27.93 kg/m².      Physical Exam  Constitutional:       Appearance: Normal appearance. He is well-developed and well-groomed.   HENT:      Head: Normocephalic and atraumatic.      Right Ear: External ear normal.      Left Ear: External ear normal.   Eyes:      General:         Right eye: No discharge.         Left eye: No discharge.      Conjunctiva/sclera: Conjunctivae normal.   Cardiovascular:      Rate and Rhythm: Normal rate.   Pulmonary:      Effort: Pulmonary effort is normal. No respiratory distress.   Musculoskeletal:      Cervical back: Neck supple.   Skin:     Findings: No rash. "   Neurological:      Mental Status: He is alert.   Psychiatric:         Mood and Affect: Mood and affect normal.         Behavior: Behavior normal.                Please note that this dictation was created using voice recognition software. I have made every reasonable attempt to correct obvious errors, but I expect that there are errors of grammar and possibly content that I did not discover before finalizing the note.

## 2025-07-14 NOTE — Clinical Note
REFERRAL APPROVAL NOTICE         Sent on July 14, 2025                   Deangelo Christiano Guy  [address] [place] [address]                   Dear Mr. Tovar,    After a careful review of the medical information and benefit coverage, [facility] has processed your referral. See below for additional details.    If applicable, you must be actively enrolled with your insurance for coverage of the authorized service. If you have any questions regarding your coverage, please contact your insurance directly.    REFERRAL INFORMATION   Referral #:  [id number]  Referred-To Provider    Referred-By Provider:  Behavioral Health    Cornell Stevenson M.D.   [facility]      [address]  [address]  [place] [address]  [phone] [address]  [place] [address]  [phone]    Referral Start Date:  07/08/2025  Referral End Date:   07/08/2026             SCHEDULING  If you do not already have an appointment, please call [phone] to make an appointment.     MORE INFORMATION  If you do not already have a [facility] Calendar account, sign up at: [email or web address]  You can access your medical information, make appointments, see lab results, billing information, and more.  If you have questions regarding this referral, please contact  the [facility] Referrals department at:             [phone]. Monday - Friday 8:00AM - 5:00PM.     Sincerely,    [facility]

## 2025-07-28 ENCOUNTER — APPOINTMENT (OUTPATIENT)
Dept: URBAN - METROPOLITAN AREA CLINIC 6 | Facility: CLINIC | Age: 35
Setting detail: DERMATOLOGY
End: 2025-07-28

## 2025-07-28 DIAGNOSIS — L28.1 PRURIGO NODULARIS: ICD-10-CM

## 2025-07-28 PROBLEM — L30.9 DERMATITIS, UNSPECIFIED: Status: ACTIVE | Noted: 2025-07-28

## 2025-07-28 PROCEDURE — ? REFERRAL

## 2025-07-28 PROCEDURE — ? ADDITIONAL NOTES

## 2025-07-28 PROCEDURE — ? COUNSELING

## 2025-07-28 ASSESSMENT — LOCATION DETAILED DESCRIPTION DERM: LOCATION DETAILED: LEFT DORSAL GREAT TOE

## 2025-07-28 ASSESSMENT — LOCATION SIMPLE DESCRIPTION DERM: LOCATION SIMPLE: LEFT GREAT TOE

## 2025-07-28 ASSESSMENT — LOCATION ZONE DERM: LOCATION ZONE: TOE

## 2025-08-07 PROBLEM — M79.672 LEFT FOOT PAIN: Status: ACTIVE | Noted: 2025-08-07

## 2025-08-07 PROBLEM — M25.572 ACUTE LEFT ANKLE PAIN: Status: ACTIVE | Noted: 2025-08-07

## 2025-08-08 ENCOUNTER — APPOINTMENT (OUTPATIENT)
Dept: MEDICAL GROUP | Facility: MEDICAL CENTER | Age: 35
End: 2025-08-08
Payer: COMMERCIAL

## 2025-08-15 ENCOUNTER — OFFICE VISIT (OUTPATIENT)
Dept: MEDICAL GROUP | Facility: MEDICAL CENTER | Age: 35
End: 2025-08-15
Payer: COMMERCIAL

## 2025-08-15 VITALS
HEART RATE: 60 BPM | WEIGHT: 213.85 LBS | DIASTOLIC BLOOD PRESSURE: 86 MMHG | SYSTOLIC BLOOD PRESSURE: 118 MMHG | OXYGEN SATURATION: 96 % | TEMPERATURE: 98 F | BODY MASS INDEX: 27.44 KG/M2 | HEIGHT: 74 IN

## 2025-08-15 DIAGNOSIS — F41.9 ANXIETY AND DEPRESSION: ICD-10-CM

## 2025-08-15 DIAGNOSIS — F32.A ANXIETY AND DEPRESSION: ICD-10-CM

## 2025-08-15 DIAGNOSIS — R03.0 ELEVATED BP WITHOUT DIAGNOSIS OF HYPERTENSION: Primary | ICD-10-CM

## 2025-08-15 PROCEDURE — 99214 OFFICE O/P EST MOD 30 MIN: CPT | Performed by: FAMILY MEDICINE

## 2025-08-15 PROCEDURE — 3074F SYST BP LT 130 MM HG: CPT | Performed by: FAMILY MEDICINE

## 2025-08-15 PROCEDURE — 3079F DIAST BP 80-89 MM HG: CPT | Performed by: FAMILY MEDICINE

## 2025-08-15 RX ORDER — CITALOPRAM HYDROBROMIDE 10 MG/1
10 TABLET ORAL DAILY
Qty: 90 TABLET | Refills: 3 | Status: SHIPPED | OUTPATIENT
Start: 2025-08-15

## 2025-08-15 ASSESSMENT — ENCOUNTER SYMPTOMS
CHILLS: 0
FEVER: 0
PALPITATIONS: 0

## 2025-08-15 ASSESSMENT — FIBROSIS 4 INDEX: FIB4 SCORE: 0.78
